# Patient Record
Sex: MALE | Race: WHITE | Employment: OTHER | URBAN - METROPOLITAN AREA
[De-identification: names, ages, dates, MRNs, and addresses within clinical notes are randomized per-mention and may not be internally consistent; named-entity substitution may affect disease eponyms.]

---

## 2017-01-24 ENCOUNTER — OFFICE VISIT (OUTPATIENT)
Dept: FAMILY MEDICINE | Facility: CLINIC | Age: 62
End: 2017-01-24
Payer: COMMERCIAL

## 2017-01-24 VITALS
RESPIRATION RATE: 14 BRPM | BODY MASS INDEX: 23.73 KG/M2 | TEMPERATURE: 97.7 F | SYSTOLIC BLOOD PRESSURE: 120 MMHG | WEIGHT: 169.5 LBS | HEIGHT: 71 IN | DIASTOLIC BLOOD PRESSURE: 72 MMHG | OXYGEN SATURATION: 100 % | HEART RATE: 55 BPM

## 2017-01-24 DIAGNOSIS — R68.84 JAW PAIN: Primary | ICD-10-CM

## 2017-01-24 DIAGNOSIS — M26.609 TMJ (TEMPOROMANDIBULAR JOINT SYNDROME): ICD-10-CM

## 2017-01-24 PROCEDURE — 99213 OFFICE O/P EST LOW 20 MIN: CPT | Performed by: PHYSICIAN ASSISTANT

## 2017-01-24 NOTE — PROGRESS NOTES
"  SUBJECTIVE:                                                    Vish Newsome is a 61 year old male who presents to clinic today for the following health issues:    Jaw Pain    Duration: X1 month    Description (location/character/radiation): right jaw    Intensity:  moderate    Accompanying signs and symptoms: Difficult to chew    History (similar episodes/previous evaluation): None    Precipitating or alleviating factors: None    Therapies tried and outcome: Ibuprophen     Started about a  Month ago where his right ear was hurting; he notes that it is more in the left side jaw first. Pain with chewing that is sharp and otherwise aching and throbbing pain that comes and goes. He notices it most at night. He has noted no clicking, popping or limitation in how much he can open or close the mouth or how tightly he can bite down.     No specific external trauma to the area.   He had septoplasty in December of 2016 and noted these symptoms shortly after that.   He is up to date on dental exams; most recent visit was about 3 months ago. Unsure when last panoramic XR was.   No hx of major dental work including root canals or surgical procedures in the right side of the jaw.     Was provided antibiotics for \"respiratory\" issue that he is taking amoxicillin for a few more days. He being treated for continued post nasal drip, mucus production, cough, sore throat- presumptive sinusitis per pt history.      No fever/chills, nausea, vomiting, body aches, joint swelling.     Problem list and histories reviewed & adjusted, as indicated.  Additional history: as documented    Patient Active Problem List   Diagnosis     Degeneration of lumbar intervertebral disc     Midline low back pain without sciatica     History reviewed. No pertinent past surgical history.    Social History   Substance Use Topics     Smoking status: Former Smoker     Smokeless tobacco: Not on file     Alcohol Use: No     Family History   Problem " "Relation Age of Onset     Family History Negative Mother      Family History Negative Father          No current outpatient prescriptions on file.       ROS:  Constitutional, HEENT, cardiovascular, pulmonary, gi and gu systems are negative, except as otherwise noted.    OBJECTIVE:                                                    /72 mmHg  Pulse 55  Temp(Src) 97.7  F (36.5  C) (Tympanic)  Resp 14  Ht 5' 10.75\" (1.797 m)  Wt 169 lb 8 oz (76.885 kg)  BMI 23.81 kg/m2  SpO2 100%  Body mass index is 23.81 kg/(m^2).  GENERAL: healthy, alert and no distress  HENT: ear canals and TM's normal, nose and mouth without ulcers or lesions. Dentition, buccal mucosa and gingiva was grossly normal  NECK: no adenopathy, no asymmetry, masses, or scars and thyroid normal to palpation  MS: temporomandibular joint is stable to manipulation, pt is able to open mouth fully, no audible clicking or popping noted. Patient is able to bite down normally while engaging the masseter muscles fully. No atrophy of musculature noted on exam.   SKIN: no swelling, bruising, rash on the face or neck.   NEURO: CN 2-12 grossly intact  PSYCH: mentation appears normal, affect normal/bright    Diagnostic Test Results:  none      ASSESSMENT/PLAN:                                                        ICD-10-CM    1. Jaw pain R68.84    2. TMJ (temporomandibular joint syndrome) M26.609 OTOLARYNGOLOGY REFERRAL     During our visit I was not convinced this was a true TMJ issue and recommended the patient have further evaluation by his dentist. He was provided a TMJ referral as above.     With further consideration I am curious if there could be some neuralgia of the glossopharyngeal nerve following his septoplasty last month (as that was about when the pain began) and the pain he describes is similar to that which is described for glossopharyngeal neuralgia.      Nicole Joy Siegler, PA-C  [facility]  "

## 2017-01-24 NOTE — NURSING NOTE
"Chief Complaint   Patient presents with     Jaw Pain     Right side of jaw X1 month, painful to chew       Initial /72 mmHg  Pulse 55  Temp(Src) 97.7  F (36.5  C) (Tympanic)  Resp 14  Ht 5' 10.75\" (1.797 m)  Wt 169 lb 8 oz (76.885 kg)  BMI 23.81 kg/m2  SpO2 100% Estimated body mass index is 23.81 kg/(m^2) as calculated from the following:    Height as of this encounter: 5' 10.75\" (1.797 m).    Weight as of this encounter: 169 lb 8 oz (76.885 kg).  BP completed using cuff size: regular  "

## 2017-01-24 NOTE — MR AVS SNAPSHOT
After Visit Summary   1/24/2017    Vish Newsome    MRN: [id number]           Patient Information     Date Of Birth          1955        Visit Information        Provider Department      1/24/2017 3:30 PM Siegler, Nicole Joy, PA-C [facility]        Today's Diagnoses     TMJ (temporomandibular joint syndrome)    -  1        Follow-ups after your visit        Additional Services     OTOLARYNGOLOGY REFERRAL       Your provider has referred you to:   [facility]: [place] [facility] (TMJ Only) [facility] [phone]   [email or web address]    Please be aware that coverage of these services is subject to the terms and limitations of your health insurance plan.  Call member services at your health plan with any benefit or coverage questions.      Please bring the following with you to your appointment:    (1) Any X-Rays, CTs or MRIs which have been performed.  Contact the facility where they were done to arrange for  prior to your scheduled appointment.   (2) List of current medications  (3) This referral request   (4) Any documents/labs given to you for this referral                  Who to contact     If you have questions or need follow up information about today's clinic visit or your schedule please contact [facility] directly at [phone].  Normal or non-critical lab and imaging results will be communicated to you by MyChart, letter or phone within 4 business days after the clinic has received the results. If you do not hear from us within 7 days, please contact the clinic through MyChart or phone. If you have a critical or abnormal lab result, we will notify you by phone as soon as possible.  Submit refill requests through [employer] or call your pharmacy and they will forward the refill request to us. Please allow 3 business days for your refill to be completed.          Additional Information About Your Visit    "     MyChart Information     [employer] lets you send messages to your doctor, view your test results, renew your prescriptions, schedule appointments and more. To sign up, go to [email or web address] . Click on \"Log in\" on the left side of the screen, which will take you to the Welcome page. Then click on \"Sign up Now\" on the right side of the page.     You will be asked to enter the access code listed below, as well as some personal information. Please follow the directions to create your username and password.     Your access code is: [email or web address]  Expires: 2017  4:00 PM     Your access code will  in 90 days. If you need help or a new code, please call your [facility] clinic or [phone].        Care EveryWhere ID     This is your Care EveryWhere ID. This could be used by other organizations to access your [facility] medical records  [id number]        Your Vitals Were     Pulse Temperature Respirations Height BMI (Body Mass Index) Pulse Oximetry    55 97.7  F (36.5  C) (Tympanic) 14 5' 10.75\" (1.797 m) 23.81 kg/m2 100%       Blood Pressure from Last 3 Encounters:   17 120/72   16 87/55   16 112/60    Weight from Last 3 Encounters:   17 169 lb 8 oz (76.885 kg)   16 165 lb 6.4 oz (75.025 kg)   16 163 lb (73.936 kg)              We Performed the Following     OTOLARYNGOLOGY REFERRAL        Primary Care Provider Office Phone # Fax #    Vincenzo Maki [phone]       [facility] [address]  [place] [address]        Thank you!     Thank you for choosing [facility]  for your care. Our goal is always to provide you with excellent care. Hearing back from our patients is one way we can continue to improve our services. Please take a few minutes to complete the written survey that you may receive in the mail after your visit with us. Thank you!             Your Updated Medication List - Protect others " around you: Learn how to safely use, store and throw away your medicines at www.disposemymeds.org.      Notice  As of 1/24/2017  4:00 PM    You have not been prescribed any medications.

## 2017-01-25 ENCOUNTER — TELEPHONE (OUTPATIENT)
Dept: FAMILY MEDICINE | Facility: CLINIC | Age: 62
End: 2017-01-25

## 2017-01-25 NOTE — TELEPHONE ENCOUNTER
I would like to know the name of his ENT (surgeon) who did his septoplasty so we can discuss with him the possibility of his jaw symptoms being caused possibly by glossopharyngeal neuralgia or some other secondary side effect be it temprorary or permanent from his recent septoplasty.

## 2017-01-25 NOTE — TELEPHONE ENCOUNTER
I LVM for Natan to call me back. If he returns the call please get the name of his ENT who did his septoplasty . I would like to speak with them regarding his current concern. Thanks! Nicole Joy Siegler, PA-C

## 2017-01-27 ENCOUNTER — TELEPHONE (OUTPATIENT)
Dept: FAMILY MEDICINE | Facility: CLINIC | Age: 62
End: 2017-01-27

## 2017-01-27 NOTE — TELEPHONE ENCOUNTER
Reason for Call: Request for an order or referral:    Order or referral being requested: Referral for TMJ    Date needed: as soon as possible    Has the patient been seen by the PCP for this problem? YES    Additional comments: Patient saw Kimberlyn earlier this week and was referred to [facility]. Patient is not going there. He has an appt on Monday 1/30/17 at [facility] [address] [place] [address] [address] [facility] [address]. Fax [phone]. Please send a new refferal to thisplace before Monday. If any questions please call    Phone number Patient can be reached at:  Home number on file [phone] (home)    Best Time:  Any    Can we leave a detailed message on this number?  YES    Call taken on 1/27/2017 at 11:34 AM by DEBBIE GUILLEN;

## 2017-01-27 NOTE — TELEPHONE ENCOUNTER
Patient states that [facility] is not contusive to his schedule and is going to go to [facility].Requesting referral be sent to them.Explained provider not in clinic today so will fax referral previously generated and fax to clinic of choice.Pt is okay with that.

## 2017-02-03 NOTE — TELEPHONE ENCOUNTER
I LVM for Dr Camarillo's care team to contact me to discuss this concern. Nicole Joy Siegler, PA-C

## 2017-02-08 NOTE — TELEPHONE ENCOUNTER
Dr Camarillo's team confirmed they are not concerned/ convinced the swelling from the surgery would have anything to do with his symptoms. Symptoms seem to have gotten better since our most recent visit. He will let us know if he has any further symptoms. Nicole Joy Siegler, PA-C

## 2017-04-28 ENCOUNTER — TRANSFERRED RECORDS (OUTPATIENT)
Dept: HEALTH INFORMATION MANAGEMENT | Facility: CLINIC | Age: 62
End: 2017-04-28

## 2017-04-28 ENCOUNTER — OFFICE VISIT (OUTPATIENT)
Dept: URGENT CARE | Facility: URGENT CARE | Age: 62
End: 2017-04-28
Payer: COMMERCIAL

## 2017-04-28 VITALS
OXYGEN SATURATION: 95 % | TEMPERATURE: 97.7 F | HEART RATE: 64 BPM | SYSTOLIC BLOOD PRESSURE: 110 MMHG | BODY MASS INDEX: 22.89 KG/M2 | WEIGHT: 163 LBS | DIASTOLIC BLOOD PRESSURE: 70 MMHG

## 2017-04-28 DIAGNOSIS — S05.92XA BILATERAL EYE INJURIES, INITIAL ENCOUNTER: Primary | ICD-10-CM

## 2017-04-28 DIAGNOSIS — S05.91XA BILATERAL EYE INJURIES, INITIAL ENCOUNTER: Primary | ICD-10-CM

## 2017-04-28 PROCEDURE — 99213 OFFICE O/P EST LOW 20 MIN: CPT | Performed by: FAMILY MEDICINE

## 2017-04-28 RX ORDER — TOBRAMYCIN 3 MG/ML
2 SOLUTION/ DROPS OPHTHALMIC 4 TIMES DAILY
Qty: 3 ML | Refills: 0 | Status: SHIPPED | OUTPATIENT
Start: 2017-04-28 | End: 2017-05-05

## 2017-04-28 NOTE — MR AVS SNAPSHOT
After Visit Summary   4/28/2017    Vish Newsome    MRN: [id number]           Patient Information     Date Of Birth          1955        Visit Information        Provider Department      4/28/2017 5:15 PM Moises Maki DO [facility]        Today's Diagnoses     Bilateral eye injuries, initial encounter    -  1       Follow-ups after your visit        Additional Services     OPHTHALMOLOGY ADULT REFERRAL       Your provider has referred you to: [facility]: Eye Clinic [facility] [phone]   [email or web address]  FHN: [facility]. [facility] [place] [phone] [email or web address]  FHN: [facility] [phone]    Please be aware that coverage of these services is subject to the terms and limitations of your health insurance plan.  Call member services at your health plan with any benefit or coverage questions.      Please bring the following with you to your appointment:    (1) Any X-Rays, CTs or MRIs which have been performed.  Contact the facility where they were done to arrange for  prior to your scheduled appointment.    (2) List of current medications  (3) This referral request   (4) Any documents/labs given to you for this referral                  Who to contact     If you have questions or need follow up information about today's clinic visit or your schedule please contact [facility] directly at [phone].  Normal or non-critical lab and imaging results will be communicated to you by MyChart, letter or phone within 4 business days after the clinic has received the results. If you do not hear from us within 7 days, please contact the clinic through MyChart or phone. If you have a critical or abnormal lab result, we will notify you by phone as soon as possible.  Submit refill requests through [employer] or call your pharmacy and they will forward  "the refill request to us. Please allow 3 business days for your refill to be completed.          Additional Information About Your Visit        [employer] Information     [employer] lets you send messages to your doctor, view your test results, renew your prescriptions, schedule appointments and more. To sign up, go to [email or web address] . Click on \"Log in\" on the left side of the screen, which will take you to the Welcome page. Then click on \"Sign up Now\" on the right side of the page.     You will be asked to enter the access code listed below, as well as some personal information. Please follow the directions to create your username and password.     Your access code is: [email or web address]  Expires: 2017  5:38 PM     Your access code will  in 90 days. If you need help or a new code, please call your [facility] clinic or [phone].        Care EveryWhere ID     This is your Care EveryWhere ID. This could be used by other organizations to access your [facility] medical records  [id number]        Your Vitals Were     Pulse Temperature Pulse Oximetry BMI (Body Mass Index)          64 97.7  F (36.5  C) (Oral) 95% 22.89 kg/m2         Blood Pressure from Last 3 Encounters:   17 110/70   17 120/72   16 (!) 87/55    Weight from Last 3 Encounters:   17 163 lb (73.9 kg)   17 169 lb 8 oz (76.9 kg)   16 165 lb 6.4 oz (75 kg)              We Performed the Following     OPHTHALMOLOGY ADULT REFERRAL          Today's Medication Changes          These changes are accurate as of: 17  5:38 PM.  If you have any questions, ask your nurse or doctor.               Start taking these medicines.        Dose/Directions    tobramycin 0.3 % ophthalmic solution   Commonly known as:  TOBREX   Used for:  Bilateral eye injuries, initial encounter        Dose:  2 drop   Place 2 drops Into the left eye 4 times daily for 7 days   Quantity:  3 mL   Refills:  0            Where to get your medicines    "   These medications were sent to [facility], [place] - [address]  [address], [facility] [address]     Phone:  [phone]     tobramycin 0.3 % ophthalmic solution                Primary Care Provider Office Phone # Fax #    Vincenzo Maki [phone]       [facility] [address]  [facility] [address]        Thank you!     Thank you for choosing [facility]  for your care. Our goal is always to provide you with excellent care. Hearing back from our patients is one way we can continue to improve our services. Please take a few minutes to complete the written survey that you may receive in the mail after your visit with us. Thank you!             Your Updated Medication List - Protect others around you: Learn how to safely use, store and throw away your medicines at www.disposemymeds.org.          This list is accurate as of: 4/28/17  5:38 PM.  Always use your most recent med list.                   Brand Name Dispense Instructions for use    tobramycin 0.3 % ophthalmic solution    TOBREX    3 mL    Place 2 drops Into the left eye 4 times daily for 7 days

## 2017-04-28 NOTE — NURSING NOTE
"Chief Complaint   Patient presents with     Eye Problem     foregin object in right eye, x 2 hour ago, possible dry wall dust       Initial /70 (BP Location: Left arm, Patient Position: Chair, Cuff Size: Adult Regular)  Pulse 64  Temp 97.7  F (36.5  C) (Oral)  Wt 163 lb (73.9 kg)  SpO2 95%  BMI 22.89 kg/m2 Estimated body mass index is 22.89 kg/(m^2) as calculated from the following:    Height as of 1/24/17: 5' 10.75\" (1.797 m).    Weight as of this encounter: 163 lb (73.9 kg).  Medication Reconciliation: complete  "

## 2017-05-10 NOTE — PROGRESS NOTES
SUBJECTIVE:Chief Complaint:   Chief Complaint   Patient presents with     Eye Problem     foregin object in right eye, x 2 hour ago, possible dry wall dust      History of Present Illness: Vish Newsome is a 61 year old male who presents complaining of both eyes injury dry wall dust for Today.  Onset/timing: sudden.   Associated Signs and Symptoms: none   Treatment measures tried include: flushed  Contact wearer : No    No past medical history on file.  No Known Allergies  Social History   Substance Use Topics     Smoking status: Former Smoker     Smokeless tobacco: Not on file     Alcohol use No       ROS:  negative for photophobia, pain, vision change    OBJECTIVE:  /70 (BP Location: Left arm, Patient Position: Chair, Cuff Size: Adult Regular)  Pulse 64  Temp 97.7  F (36.5  C) (Oral)  Wt 163 lb (73.9 kg)  SpO2 95%  BMI 22.89 kg/m2   General: no acute distress  Eye exam: right eye normal lid, conjunctiva, cornea, pupil and fundus, left eye normal lid, conjunctiva, cornea, pupil and fundus.  JOSEPH, EOMI, fundi normal, corneas normal, no foreign bodies, flourescein staining is negative, visual acuity normal both eyes, no periorbital cellulitis      ICD-10-CM    1. Bilateral eye injuries, initial encounter S05.91XA tobramycin (TOBREX) 0.3 % ophthalmic solution    S05.92XA OPHTHALMOLOGY ADULT REFERRAL       Return to clinic if no improvement or worsening condition.

## 2017-09-28 ENCOUNTER — ALLIED HEALTH/NURSE VISIT (OUTPATIENT)
Dept: NURSING | Facility: CLINIC | Age: 62
End: 2017-09-28
Payer: COMMERCIAL

## 2017-09-28 DIAGNOSIS — Z23 NEED FOR PROPHYLACTIC VACCINATION AND INOCULATION AGAINST INFLUENZA: Primary | ICD-10-CM

## 2017-09-28 PROCEDURE — 90471 IMMUNIZATION ADMIN: CPT

## 2017-09-28 PROCEDURE — 90686 IIV4 VACC NO PRSV 0.5 ML IM: CPT

## 2017-09-28 PROCEDURE — 99207 ZZC NO CHARGE NURSE ONLY: CPT

## 2017-09-28 NOTE — PROGRESS NOTES
Injectable Influenza Immunization Documentation    1.  Is the person to be vaccinated sick today?   No    2. Does the person to be vaccinated have an allergy to a component   of the vaccine?   No    3. Has the person to be vaccinated ever had a serious reaction   to influenza vaccine in the past?   No    4. Has the person to be vaccinated ever had Guillain-Barré syndrome?   No    Form completed by Yesi Olmstead CMA

## 2017-09-28 NOTE — MR AVS SNAPSHOT
"              After Visit Summary   9/28/2017    Vish Newsome    MRN: [id number]           Patient Information     Date Of Birth          1955        Visit Information        Provider Department      9/28/2017 3:30 PM BX NURSE [facility]        Today's Diagnoses     Need for prophylactic vaccination and inoculation against influenza    -  1       Follow-ups after your visit        Your next 10 appointments already scheduled     Oct 04, 2017  4:15 PM CDT   PHYSICAL with Moises [facility]tero [facility]   [facility]    [address] [place] [address]   [phone]              Who to contact     If you have questions or need follow up information about today's clinic visit or your schedule please contact [facility] directly at [phone].  Normal or non-critical lab and imaging results will be communicated to you by MyChart, letter or phone within 4 business days after the clinic has received the results. If you do not hear from us within 7 days, please contact the clinic through [employer] or phone. If you have a critical or abnormal lab result, we will notify you by phone as soon as possible.  Submit refill requests through [employer] or call your pharmacy and they will forward the refill request to us. Please allow 3 business days for your refill to be completed.          Additional Information About Your Visit        MyChart Information     [employer] lets you send messages to your doctor, view your test results, renew your prescriptions, schedule appointments and more. To sign up, go to [email or web address] . Click on \"Log in\" on the left side of the screen, which will take you to the Welcome page. Then click on \"Sign up Now\" on the right side of the page.     You will be asked to enter the access code listed below, as well as some personal information. " Please follow the directions to create your username and password.     Your access code is: [email or web address]  Expires: 2017  3:45 PM     Your access code will  in 90 days. If you need help or a new code, please call your [facility] clinic or [phone].        Care EveryWhere ID     This is your Care EveryWhere ID. This could be used by other organizations to access your [facility] medical records  [id number]         Blood Pressure from Last 3 Encounters:   17 110/70   17 120/72   16 (!) 87/55    Weight from Last 3 Encounters:   17 163 lb (73.9 kg)   17 169 lb 8 oz (76.9 kg)   16 165 lb 6.4 oz (75 kg)              We Performed the Following     FLU VAC, SPLIT VIRUS IM > 3 YO (QUADRIVALENT) [98470]     Vaccine Administration, Initial [269[address]]        Primary Care Provider Office Phone # Fax #    Vincenzo Maki [phone] [address] [address]        Equal Access to Services     [facility]: Hadii aad ku hadasho Soomaali, waaxda luqadaha, qaybta kaalmada adeegyada, magalys peguero . So [facility] [phone].    ATENCIÓN: Si habla español, tiene a fabian disposición servicios gratuitos de asistencia lingüística. Llame al [phone].    We comply with applicable federal civil rights laws and [place] laws. We do not discriminate on the basis of race, color, national origin, age, disability sex, sexual orientation or gender identity.            Thank you!     Thank you for choosing [facility]  for your care. Our goal is always to provide you with excellent care. Hearing back from our patients is one way we can continue to improve our services. Please take a few minutes to complete the written survey that you may receive in the mail after your visit with us. Thank you!             Your Updated Medication List - Protect others around you: Learn how to safely use, store and throw away  your medicines at www.disposemymeds.org.      Notice  As of 9/28/2017  3:45 PM    You have not been prescribed any medications.

## 2017-10-04 ENCOUNTER — OFFICE VISIT (OUTPATIENT)
Dept: FAMILY MEDICINE | Facility: CLINIC | Age: 62
End: 2017-10-04
Payer: COMMERCIAL

## 2017-10-04 VITALS
WEIGHT: 163 LBS | TEMPERATURE: 98.5 F | BODY MASS INDEX: 22.82 KG/M2 | OXYGEN SATURATION: 100 % | DIASTOLIC BLOOD PRESSURE: 64 MMHG | RESPIRATION RATE: 20 BRPM | HEIGHT: 71 IN | SYSTOLIC BLOOD PRESSURE: 118 MMHG | HEART RATE: 57 BPM

## 2017-10-04 DIAGNOSIS — E78.5 HYPERLIPIDEMIA LDL GOAL <100: ICD-10-CM

## 2017-10-04 DIAGNOSIS — Z00.00 ROUTINE GENERAL MEDICAL EXAMINATION AT A HEALTH CARE FACILITY: Primary | ICD-10-CM

## 2017-10-04 DIAGNOSIS — R53.82 CHRONIC FATIGUE: ICD-10-CM

## 2017-10-04 DIAGNOSIS — E55.9 VITAMIN D DEFICIENCY: ICD-10-CM

## 2017-10-04 PROBLEM — Z29.9 PREVENTIVE MEASURE: Status: ACTIVE | Noted: 2017-10-04

## 2017-10-04 PROCEDURE — 99396 PREV VISIT EST AGE 40-64: CPT | Performed by: INTERNAL MEDICINE

## 2017-10-04 NOTE — PATIENT INSTRUCTIONS
"I have generated a lab order.              Please call ahead for a \"lab only\" appointment.           I will let you know your lab results.       "

## 2017-10-04 NOTE — NURSING NOTE
"Chief Complaint   Patient presents with     Physical       Initial /64 (BP Location: Left arm, Patient Position: Chair, Cuff Size: Adult Large)  Pulse 57  Temp 98.5  F (36.9  C)  Resp 20  Ht 5' 10.75\" (1.797 m)  Wt 163 lb (73.9 kg)  SpO2 100%  BMI 22.89 kg/m2 Estimated body mass index is 22.89 kg/(m^2) as calculated from the following:    Height as of this encounter: 5' 10.75\" (1.797 m).    Weight as of this encounter: 163 lb (73.9 kg).  Medication Reconciliation: complete   Yasmin Laguerre LPN  "

## 2017-10-04 NOTE — PROGRESS NOTES
"SUBJECTIVE:   CC: Vish Newsome is an 61 year old male who presents for preventative health visit.     Physical   Annual:     Getting at least 3 servings of Calcium per day::  Yes    Bi-annual eye exam::  Yes    Dental care twice a year::  Yes    Sleep apnea or symptoms of sleep apnea::  None    Diet::  Regular (no restrictions)    Frequency of exercise::  2-3 days/week    Duration of exercise::  45-60 minutes    Taking medications regularly::  No    Barriers to taking medications::  Not applicable    Additional concerns today::  No            New pt to me.                   Does not want prostate cancer screening.     He states that prostate cancer screening is \"a hoax\".                    Wants labs to include an A1C.        Takes vit B12.       Wants to check vit D.                        He wrote down the names of some other lab tests that he wanted done, but he left the list at home.             He believes that overall he is healthy. He exercises. Does have some chronic fatigue symptoms. Has some poor sleep issues for which he went to the sleep clinic last year.                             Today's PHQ-2 Score: PHQ-2 ( 1999 [employer]) 10/4/2017   Q1: Little interest or pleasure in doing things 0   Q2: Feeling down, depressed or hopeless 0   PHQ-2 Score 0   Q1: Little interest or pleasure in doing things Not at all   Q2: Feeling down, depressed or hopeless Not at all   PHQ-2 Score 0       Abuse: Current or Past(Physical, Sexual or Emotional)- No  Do you feel safe in your environment - Yes      The patient does not drink >3 drinks per day nor >7 drinks per week.    Last PSA: No results found for: PSA    Reviewed orders with patient. Reviewed health maintenance and updated orders accordingly - Yes  Patient Active Problem List    Diagnosis Date Noted     Hyperlipidemia LDL goal <100 10/04/2017     Priority: Medium     Chronic fatigue 10/04/2017     Priority: Medium     Degeneration of lumbar intervertebral disc " 04/05/2016     Priority: Medium     Midline low back pain without sciatica 04/05/2016     Priority: Medium     Preventive measure 10/04/2017     Priority: Low     colonoscopy 10/16;          Past Surgical History:   Procedure Laterality Date     ENT SURGERY  10/2016    nasal septal deviation     Social History     Social History     Marital status:      Spouse name: N/A     Number of children: 0     Years of education: N/A     Occupational History     Not on file.     Social History Main Topics     Smoking status: Never Smoker     Smokeless tobacco: Not on file     Alcohol use No     Drug use: No     Sexual activity: Yes     Partners: Female     Other Topics Concern     Not on file     Social History Narrative         BP Readings from Last 3 Encounters:   10/04/17 118/64   04/28/17 110/70   01/24/17 120/72    Wt Readings from Last 3 Encounters:   10/04/17 163 lb (73.9 kg)   04/28/17 163 lb (73.9 kg)   01/24/17 169 lb 8 oz (76.9 kg)                  No current outpatient prescriptions on file.     No Known Allergies      Reviewed and updated as needed this visit by clinical staffAllergies         Reviewed and updated as needed this visit by Provider              ROS:  C: NEGATIVE for fever, chills, change in weight  I: NEGATIVE for worrisome rashes, moles or lesions  E: NEGATIVE for vision changes or irritation  ENT: NEGATIVE for ear, mouth and throat problems  R: NEGATIVE for significant cough or SOB  CV: NEGATIVE for chest pain, palpitations or peripheral edema  GI: NEGATIVE for nausea, abdominal pain, heartburn, or change in bowel habits   male: negative for dysuria, hematuria, decreased urinary stream, erectile dysfunction, urethral discharge  M: NEGATIVE for significant arthralgias or myalgia  N: NEGATIVE for weakness, dizziness or paresthesias  P: NEGATIVE for changes in mood or affect    OBJECTIVE:   /64 (BP Location: Left arm, Patient Position: Chair, Cuff Size: Adult Large)  Pulse 57  Temp  "98.5  F (36.9  C)  Resp 20  Ht 5' 10.75\" (1.797 m)  Wt 163 lb (73.9 kg)  SpO2 100%  BMI 22.89 kg/m2    EXAM:  GENERAL: healthy, alert and no distress  EYES: Eyes grossly normal to inspection, PERRL and conjunctivae and sclerae normal  HENT: ear canals and TM's normal, nose and mouth without ulcers or lesions  NECK: no adenopathy, no asymmetry, masses, or scars and thyroid normal to palpation  RESP: lungs clear to auscultation - no rales, rhonchi or wheezes  CV: regular rate and rhythm, normal S1 S2, no S3 or S4, no murmur, click or rub, no peripheral edema and peripheral pulses strong  ABDOMEN: soft, nontender, no hepatosplenomegaly, no masses and bowel sounds normal   (male): normal male genitalia without lesions or urethral discharge, no hernia  RECTAL: refused  MS: no gross musculoskeletal defects noted, no edema  SKIN: no suspicious lesions or rashes  NEURO: Normal strength and tone, mentation intact and speech normal  PSYCH: mentation appears normal, affect normal/bright    ASSESSMENT/PLAN:   Vish was seen today for physical.    Diagnoses and all orders for this visit:    Routine general medical examination at a health care facility    Hyperlipidemia LDL goal <100  -     Comprehensive metabolic panel (BMP + Alb, Alk Phos, ALT, AST, Total. Bili, TP); Future  -     Lipid Profile (Chol, Trig, HDL, LDL calc); Future  -     Hemoglobin A1c; Future    Chronic fatigue  -     Lipid Profile (Chol, Trig, HDL, LDL calc); Future  -     TSH with free T4 reflex; Future  -     CBC with platelets and differential; Future    Vitamin D deficiency  -     Vitamin D Deficiency; Future                       Summary and implications:  Overall he appears to be in good health.         Patient Instructions   I have generated a lab order.              Please call ahead for a \"lab only\" appointment.           I will let you know your lab results.             COUNSELING:   Reviewed preventive health counseling, as reflected in " "patient instructions  Special attention given to:        Regular exercise       Healthy diet/nutrition         reports that he has quit smoking. He does not have any smokeless tobacco history on file.      Estimated body mass index is 22.89 kg/(m^2) as calculated from the following:    Height as of 1/24/17: 5' 10.75\" (1.797 m).    Weight as of 4/28/17: 163 lb (73.9 kg).         Counseling Resources:  ATP IV Guidelines  Pooled Cohorts Equation Calculator  FRAX Risk Assessment  ICSI Preventive Guidelines  Dietary Guidelines for Americans, 2010  [employer]'s MyPlate  ASA Prophylaxis  Lung CA Screening    Moises Otero MD  [facility] XERXESAnswers for HPI/ROS submitted by the patient on 10/4/2017   PHQ-2 Score: 0           Recent Results (from the past 90 hour(s))   Comprehensive metabolic panel (BMP + Alb, Alk Phos, ALT, AST, Total. Bili, TP)    Collection Time: 12/12/17  8:13 AM   Result Value Ref Range    Sodium 140 133 - 144 mmol/L    Potassium 4.1 3.4 - 5.3 mmol/L    Chloride 106 94 - 109 mmol/L    Carbon Dioxide 29 20 - 32 mmol/L    Anion Gap 5 3 - 14 mmol/L    Glucose 90 70 - 99 mg/dL    Urea Nitrogen 16 7 - 30 mg/dL    Creatinine 1.10 0.66 - 1.25 mg/dL    GFR Estimate 68 >60 mL/min/1.7m2    GFR Estimate If Black 82 >60 mL/min/1.7m2    Calcium 9.0 8.5 - 10.1 mg/dL    Bilirubin Total 1.2 0.2 - 1.3 mg/dL    Albumin 3.8 3.4 - 5.0 g/dL    Protein Total 7.3 6.8 - 8.8 g/dL    Alkaline Phosphatase 97 40 - 150 U/L    ALT 34 0 - 70 U/L    AST 31 0 - 45 U/L   Lipid panel reflex to direct LDL    Collection Time: 12/12/17  8:13 AM   Result Value Ref Range    Cholesterol 210 (H) <200 mg/dL    Triglycerides 115 <150 mg/dL    HDL Cholesterol 51 >39 mg/dL    LDL Cholesterol Calculated 136 (H) <100 mg/dL    Non HDL Cholesterol 159 (H) <130 mg/dL   TSH with free T4 reflex    Collection Time: 12/12/17  8:13 AM   Result Value Ref Range    TSH 3.36 0.40 - 4.00 mU/L   CBC with platelets and differential    " "Collection Time: 12/12/17  8:13 AM   Result Value Ref Range    WBC 5.6 4.0 - 11.0 10e9/L    RBC Count 5.04 4.4 - 5.9 10e12/L    Hemoglobin 15.4 13.3 - 17.7 g/dL    Hematocrit 45.5 40.0 - 53.0 %    MCV 90 78 - 100 fl    MCH 30.6 26.5 - 33.0 pg    MCHC 33.8 31.5 - 36.5 g/dL    RDW 14.0 10.0 - 15.0 %    Platelet Count 274 150 - 450 10e9/L    Diff Method Automated Method     % Neutrophils 52.1 %    % Lymphocytes 37.3 %    % Monocytes 7.2 %    % Eosinophils 2.9 %    % Basophils 0.5 %    Absolute Neutrophil 2.9 1.6 - 8.3 10e9/L    Absolute Lymphocytes 2.1 0.8 - 5.3 10e9/L    Absolute Monocytes 0.4 0.0 - 1.3 10e9/L    Absolute Eosinophils 0.2 0.0 - 0.7 10e9/L    Absolute Basophils 0.0 0.0 - 0.2 10e9/L   Vitamin D Deficiency    Collection Time: 12/12/17  8:13 AM   Result Value Ref Range    Vitamin D Deficiency screening 65 20 - 75 ug/L   Hemoglobin A1c    Collection Time: 12/12/17  8:13 AM   Result Value Ref Range    Hemoglobin A1C 5.2 4.3 - 6.0 %     Letter sent.                  Most of your lab results are very good,including the kidney,liver,bone marrow,thyroid,glucose,and the vitamin D level.                The A1C of 5.2 correlates to an average blood sugar of approximately 96.             Your LDL or \" bad\" cholesterol is slightly high at 136.                              "

## 2017-10-04 NOTE — MR AVS SNAPSHOT
"              After Visit Summary   10/4/2017    Vish Newsome    MRN: [id number]           Patient Information     Date Of Birth          1955        Visit Information        Provider Department      10/4/2017 4:15 PM Moises Otero MD [facility]        Today's Diagnoses     Routine general medical examination at a health care facility    -  1    Hyperlipidemia LDL goal <100        Chronic fatigue        Vitamin D deficiency          Care Instructions    I have generated a lab order.              Please call ahead for a \"lab only\" appointment.           I will let you know your lab results.               Follow-ups after your visit        Future tests that were ordered for you today     Open Future Orders        Priority Expected Expires Ordered    Comprehensive metabolic panel (BMP + Alb, Alk Phos, ALT, AST, Total. Bili, TP) Routine 10/5/2017 12/4/2017 10/4/2017    Lipid Profile (Chol, Trig, HDL, LDL calc) Routine 10/5/2017 12/4/2017 10/4/2017    TSH with free T4 reflex Routine 10/5/2017 12/4/2017 10/4/2017    CBC with platelets and differential Routine 10/5/2017 12/4/2017 10/4/2017    Vitamin D Deficiency Routine 10/5/2017 12/4/2017 10/4/2017    Hemoglobin A1c Routine 10/5/2017 12/4/2017 10/4/2017            Who to contact     If you have questions or need follow up information about today's clinic visit or your schedule please contact [facility] directly at [phone].  Normal or non-critical lab and imaging results will be communicated to you by MyChart, letter or phone within 4 business days after the clinic has received the results. If you do not hear from us within 7 days, please contact the clinic through MyChart or phone. If you have a critical or abnormal lab result, we will notify you by phone as soon as possible.  Submit refill requests through [employer] or call your pharmacy and they will forward the refill request to us. Please " "allow 3 business days for your refill to be completed.          Additional Information About Your Visit        Care EveryWhere ID     This is your Care EveryWhere ID. This could be used by other organizations to access your [facility] medical records  [id number]        Your Vitals Were     Pulse Temperature Respirations Height Pulse Oximetry BMI (Body Mass Index)    57 98.5  F (36.9  C) 20 5' 10.75\" (1.797 m) 100% 22.89 kg/m2       Blood Pressure from Last 3 Encounters:   10/04/17 118/64   04/28/17 110/70   01/24/17 120/72    Weight from Last 3 Encounters:   10/04/17 163 lb (73.9 kg)   04/28/17 163 lb (73.9 kg)   01/24/17 169 lb 8 oz (76.9 kg)               Primary Care Provider Office Phone # Fax #    Vincenzo Maki [phone] [address] [facility] [address]  [place] [address]        Equal Access to Services     [facility]: Hadii aad ku hadasho Soomaali, waaxda luqadaha, qaybta kaalmada adeegyada, waxay idiin hayaan russeleg kharaharoon la'ceciln . So [facility] [phone].    ATENCIÓN: Si habla español, tiene a fabian disposición servicios gratuitos de asistencia lingüística. Pashaame al [phone].    We comply with applicable federal civil rights laws and [place] laws. We do not discriminate on the basis of race, color, national origin, age, disability, sex, sexual orientation, or gender identity.            Thank you!     Thank you for choosing [facility]  for your care. Our goal is always to provide you with excellent care. Hearing back from our patients is one way we can continue to improve our services. Please take a few minutes to complete the written survey that you may receive in the mail after your visit with us. Thank you!             Your Updated Medication List - Protect others around you: Learn how to safely use, store and throw away your medicines at www.disposemymeds.org.      Notice  As of 10/4/2017  4:58 PM    You have not been prescribed any medications.      "

## 2017-10-06 DIAGNOSIS — E55.9 VITAMIN D DEFICIENCY: ICD-10-CM

## 2017-10-06 DIAGNOSIS — E78.5 HYPERLIPIDEMIA LDL GOAL <100: ICD-10-CM

## 2017-10-06 DIAGNOSIS — R53.82 CHRONIC FATIGUE: ICD-10-CM

## 2017-10-27 ENCOUNTER — TELEPHONE (OUTPATIENT)
Dept: FAMILY MEDICINE | Facility: CLINIC | Age: 62
End: 2017-10-27

## 2017-10-27 NOTE — TELEPHONE ENCOUNTER
Reason for Call:  Other Establish care    Detailed comments: Dr. Tineo wrote note for FD today that it is okay  For pt to establish care with Dr. Tineo    Best Time: Anytime

## 2017-12-12 DIAGNOSIS — E78.5 HYPERLIPIDEMIA LDL GOAL <100: ICD-10-CM

## 2017-12-12 DIAGNOSIS — R53.82 CHRONIC FATIGUE: ICD-10-CM

## 2017-12-12 DIAGNOSIS — E55.9 VITAMIN D DEFICIENCY: ICD-10-CM

## 2017-12-12 LAB
ALBUMIN SERPL-MCNC: 3.8 G/DL (ref 3.4–5)
ALP SERPL-CCNC: 97 U/L (ref 40–150)
ALT SERPL W P-5'-P-CCNC: 34 U/L (ref 0–70)
ANION GAP SERPL CALCULATED.3IONS-SCNC: 5 MMOL/L (ref 3–14)
AST SERPL W P-5'-P-CCNC: 31 U/L (ref 0–45)
BASOPHILS # BLD AUTO: 0 10E9/L (ref 0–0.2)
BASOPHILS NFR BLD AUTO: 0.5 %
BILIRUB SERPL-MCNC: 1.2 MG/DL (ref 0.2–1.3)
BUN SERPL-MCNC: 16 MG/DL (ref 7–30)
CALCIUM SERPL-MCNC: 9 MG/DL (ref 8.5–10.1)
CHLORIDE SERPL-SCNC: 106 MMOL/L (ref 94–109)
CHOLEST SERPL-MCNC: 210 MG/DL
CO2 SERPL-SCNC: 29 MMOL/L (ref 20–32)
CREAT SERPL-MCNC: 1.1 MG/DL (ref 0.66–1.25)
DIFFERENTIAL METHOD BLD: NORMAL
EOSINOPHIL # BLD AUTO: 0.2 10E9/L (ref 0–0.7)
EOSINOPHIL NFR BLD AUTO: 2.9 %
ERYTHROCYTE [DISTWIDTH] IN BLOOD BY AUTOMATED COUNT: 14 % (ref 10–15)
GFR SERPL CREATININE-BSD FRML MDRD: 68 ML/MIN/1.7M2
GLUCOSE SERPL-MCNC: 90 MG/DL (ref 70–99)
HBA1C MFR BLD: 5.2 % (ref 4.3–6)
HCT VFR BLD AUTO: 45.5 % (ref 40–53)
HDLC SERPL-MCNC: 51 MG/DL
HGB BLD-MCNC: 15.4 G/DL (ref 13.3–17.7)
LDLC SERPL CALC-MCNC: 136 MG/DL
LYMPHOCYTES # BLD AUTO: 2.1 10E9/L (ref 0.8–5.3)
LYMPHOCYTES NFR BLD AUTO: 37.3 %
MCH RBC QN AUTO: 30.6 PG (ref 26.5–33)
MCHC RBC AUTO-ENTMCNC: 33.8 G/DL (ref 31.5–36.5)
MCV RBC AUTO: 90 FL (ref 78–100)
MONOCYTES # BLD AUTO: 0.4 10E9/L (ref 0–1.3)
MONOCYTES NFR BLD AUTO: 7.2 %
NEUTROPHILS # BLD AUTO: 2.9 10E9/L (ref 1.6–8.3)
NEUTROPHILS NFR BLD AUTO: 52.1 %
NONHDLC SERPL-MCNC: 159 MG/DL
PLATELET # BLD AUTO: 274 10E9/L (ref 150–450)
POTASSIUM SERPL-SCNC: 4.1 MMOL/L (ref 3.4–5.3)
PROT SERPL-MCNC: 7.3 G/DL (ref 6.8–8.8)
RBC # BLD AUTO: 5.04 10E12/L (ref 4.4–5.9)
SODIUM SERPL-SCNC: 140 MMOL/L (ref 133–144)
TRIGL SERPL-MCNC: 115 MG/DL
TSH SERPL DL<=0.005 MIU/L-ACNC: 3.36 MU/L (ref 0.4–4)
WBC # BLD AUTO: 5.6 10E9/L (ref 4–11)

## 2017-12-12 PROCEDURE — 80050 GENERAL HEALTH PANEL: CPT | Performed by: INTERNAL MEDICINE

## 2017-12-12 PROCEDURE — 82306 VITAMIN D 25 HYDROXY: CPT | Performed by: INTERNAL MEDICINE

## 2017-12-12 PROCEDURE — 80061 LIPID PANEL: CPT | Performed by: INTERNAL MEDICINE

## 2017-12-12 PROCEDURE — 36415 COLL VENOUS BLD VENIPUNCTURE: CPT | Performed by: INTERNAL MEDICINE

## 2017-12-12 PROCEDURE — 83036 HEMOGLOBIN GLYCOSYLATED A1C: CPT | Performed by: INTERNAL MEDICINE

## 2017-12-13 LAB — DEPRECATED CALCIDIOL+CALCIFEROL SERPL-MC: 65 UG/L (ref 20–75)

## 2018-09-17 ENCOUNTER — TELEPHONE (OUTPATIENT)
Dept: FAMILY MEDICINE | Facility: CLINIC | Age: 63
End: 2018-09-17

## 2018-09-17 DIAGNOSIS — E78.5 HYPERLIPIDEMIA LDL GOAL <100: Primary | ICD-10-CM

## 2018-09-17 DIAGNOSIS — Z12.5 PROSTATE CANCER SCREENING: ICD-10-CM

## 2018-09-17 NOTE — TELEPHONE ENCOUNTER
Called pt- no answer, left VM informing him everything is set up and ordered.    Adeel STYLES RN

## 2018-09-17 NOTE — TELEPHONE ENCOUNTER
Reason for Call: Request for an order or referral:    Order or referral being requested: Fasting Labs     Date needed: before my next appointment    Has the patient been seen by the PCP for this problem? YES    Additional comments: Patient has px on 10/11/18     Phone number Patient can be reached at:  Home number on file [phone] (home)    Best Time:  Anytime     Can we leave a detailed message on this number?  YES    Call taken on 9/17/2018 at 9:06 AM by Shruthi Gordon

## 2018-09-25 DIAGNOSIS — E78.5 HYPERLIPIDEMIA LDL GOAL <100: ICD-10-CM

## 2018-09-25 DIAGNOSIS — Z12.5 PROSTATE CANCER SCREENING: ICD-10-CM

## 2018-09-25 LAB
ALBUMIN SERPL-MCNC: 3.8 G/DL (ref 3.4–5)
ALP SERPL-CCNC: 105 U/L (ref 40–150)
ALT SERPL W P-5'-P-CCNC: 32 U/L (ref 0–70)
ANION GAP SERPL CALCULATED.3IONS-SCNC: 7 MMOL/L (ref 3–14)
AST SERPL W P-5'-P-CCNC: 27 U/L (ref 0–45)
BILIRUB SERPL-MCNC: 0.9 MG/DL (ref 0.2–1.3)
BUN SERPL-MCNC: 20 MG/DL (ref 7–30)
CALCIUM SERPL-MCNC: 9 MG/DL (ref 8.5–10.1)
CHLORIDE SERPL-SCNC: 106 MMOL/L (ref 94–109)
CHOLEST SERPL-MCNC: 198 MG/DL
CO2 SERPL-SCNC: 29 MMOL/L (ref 20–32)
CREAT SERPL-MCNC: 0.98 MG/DL (ref 0.66–1.25)
ERYTHROCYTE [DISTWIDTH] IN BLOOD BY AUTOMATED COUNT: 14.2 % (ref 10–15)
GFR SERPL CREATININE-BSD FRML MDRD: 77 ML/MIN/1.7M2
GLUCOSE SERPL-MCNC: 87 MG/DL (ref 70–99)
HCT VFR BLD AUTO: 45.9 % (ref 40–53)
HDLC SERPL-MCNC: 52 MG/DL
HGB BLD-MCNC: 15.1 G/DL (ref 13.3–17.7)
LDLC SERPL CALC-MCNC: 123 MG/DL
MCH RBC QN AUTO: 30.1 PG (ref 26.5–33)
MCHC RBC AUTO-ENTMCNC: 32.9 G/DL (ref 31.5–36.5)
MCV RBC AUTO: 91 FL (ref 78–100)
NONHDLC SERPL-MCNC: 146 MG/DL
PLATELET # BLD AUTO: 245 10E9/L (ref 150–450)
POTASSIUM SERPL-SCNC: 4.7 MMOL/L (ref 3.4–5.3)
PROT SERPL-MCNC: 7.5 G/DL (ref 6.8–8.8)
RBC # BLD AUTO: 5.02 10E12/L (ref 4.4–5.9)
SODIUM SERPL-SCNC: 142 MMOL/L (ref 133–144)
TRIGL SERPL-MCNC: 117 MG/DL
WBC # BLD AUTO: 5.2 10E9/L (ref 4–11)

## 2018-09-25 PROCEDURE — 85027 COMPLETE CBC AUTOMATED: CPT | Performed by: INTERNAL MEDICINE

## 2018-09-25 PROCEDURE — 80053 COMPREHEN METABOLIC PANEL: CPT | Performed by: INTERNAL MEDICINE

## 2018-09-25 PROCEDURE — 36415 COLL VENOUS BLD VENIPUNCTURE: CPT | Performed by: INTERNAL MEDICINE

## 2018-09-25 PROCEDURE — G0103 PSA SCREENING: HCPCS | Performed by: INTERNAL MEDICINE

## 2018-09-25 PROCEDURE — 80061 LIPID PANEL: CPT | Performed by: INTERNAL MEDICINE

## 2018-09-25 NOTE — LETTER
"[facility]  [address] [facility]  [address]  [place]  [address]  Tel: [phone]    September 27, 2018    Vishmisael Draperberrys  [address]  [place] [address]        Dear Mr. Newsome,    The following letter pertains to your most recent diagnostic tests:    -Your PSA test is mildly elevated we can discuss this result further when I see you for your physical early next month.    -Liver and gallbladder tests are normal for you. (ALT,AST, Alk phos, bilirubin), kidney function is normal for you (Creatinine, GFR), Sodium is normal, Potassium is normal for you, Calcium is normal for you, Glucose (blood sugar) is normal for you.      -Your total cholesterol is 198 which is at your goal of total cholesterol less than 200.    -Your triglycerides are 117 which are at your goal of triglycerides less than 150.    -Your HDL or \"good cholesterol\" is 52 which is at your goal of HDL cholesterol greater than 40.    -Your LDL cholesterol or \"bad cholesterol\" is 123 which is at your goal of LDL cholesterol less than <130.  Your LDL goal is based on your risk factors for artery disease.     -Your complete blood counts including your hemoglobin returned normal for you.         Follow up:  We can discuss these results further when we see you in October for your physical appointment.        Sincerely,    Brian Tineo MD/[facility]          Enclosure: Lab Results  Results for orders placed or performed in visit on 09/25/18   Comprehensive metabolic panel   Result Value Ref Range    Sodium 142 133 - 144 mmol/L    Potassium 4.7 3.4 - 5.3 mmol/L    Chloride 106 94 - 109 mmol/L    Carbon Dioxide 29 20 - 32 mmol/L    Anion Gap 7 3 - 14 mmol/L    Glucose 87 70 - 99 mg/dL    Urea Nitrogen 20 7 - 30 mg/dL    Creatinine 0.98 0.66 - 1.25 mg/dL    GFR Estimate 77 >60 mL/min/1.7m2    GFR Estimate If Black >90 >60 mL/min/1.7m2    Calcium 9.0 8.5 - 10.1 mg/dL    Bilirubin Total 0.9 0.2 - 1.3 mg/dL    Albumin 3.8 3.4 - 5.0 g/dL    " Protein Total 7.5 6.8 - 8.8 g/dL    Alkaline Phosphatase 105 40 - 150 U/L    ALT 32 0 - 70 U/L    AST 27 0 - 45 U/L   CBC with platelets   Result Value Ref Range    WBC 5.2 4.0 - 11.0 10e9/L    RBC Count 5.02 4.4 - 5.9 10e12/L    Hemoglobin 15.1 13.3 - 17.7 g/dL    Hematocrit 45.9 40.0 - 53.0 %    MCV 91 78 - 100 fl    MCH 30.1 26.5 - 33.0 pg    MCHC 32.9 31.5 - 36.5 g/dL    RDW 14.2 10.0 - 15.0 %    Platelet Count 245 150 - 450 10e9/L   Lipid panel reflex to direct LDL Fasting   Result Value Ref Range    Cholesterol 198 <200 mg/dL    Triglycerides 117 <150 mg/dL    HDL Cholesterol 52 >39 mg/dL    LDL Cholesterol Calculated 123 (H) <100 mg/dL    Non HDL Cholesterol 146 (H) <130 mg/dL   Prostate spec antigen screen   Result Value Ref Range    PSA 7.65 (H) 0 - 4 ug/L

## 2018-09-26 LAB — PSA SERPL-ACNC: 7.65 UG/L (ref 0–4)

## 2018-09-26 NOTE — PROGRESS NOTES
"The following letter pertains to your most recent diagnostic tests:    -Your PSA test is mildly elevated we can discuss this result further when I see you for your physical early next month.    -Liver and gallbladder tests are normal for you. (ALT,AST, Alk phos, bilirubin), kidney function is normal for you (Creatinine, GFR), Sodium is normal, Potassium is normal for you, Calcium is normal for you, Glucose (blood sugar) is normal for you.      -Your total cholesterol is 198 which is at your goal of total cholesterol less than 200.    -Your triglycerides are 117 which are at your goal of triglycerides less than 150.    -Your HDL or \"good cholesterol\" is 52 which is at your goal of HDL cholesterol greater than 40.    -Your LDL cholesterol or \"bad cholesterol\" is 123 which is at your goal of LDL cholesterol less than <130.  Your LDL goal is based on your risk factors for artery disease.     -Your complete blood counts including your hemoglobin returned normal for you.         Follow up:  We can discuss these results further when we see you in October for your physical appointment.        Sincerely,    Dr. Tineo"

## 2018-10-11 ENCOUNTER — OFFICE VISIT (OUTPATIENT)
Dept: FAMILY MEDICINE | Facility: CLINIC | Age: 63
End: 2018-10-11
Payer: COMMERCIAL

## 2018-10-11 VITALS
SYSTOLIC BLOOD PRESSURE: 114 MMHG | WEIGHT: 163.7 LBS | BODY MASS INDEX: 22.92 KG/M2 | HEIGHT: 71 IN | DIASTOLIC BLOOD PRESSURE: 69 MMHG | OXYGEN SATURATION: 100 % | HEART RATE: 58 BPM

## 2018-10-11 DIAGNOSIS — Z00.00 ROUTINE GENERAL MEDICAL EXAMINATION AT A HEALTH CARE FACILITY: Primary | ICD-10-CM

## 2018-10-11 DIAGNOSIS — R97.20 ELEVATED PROSTATE SPECIFIC ANTIGEN (PSA): ICD-10-CM

## 2018-10-11 DIAGNOSIS — Z23 NEED FOR PROPHYLACTIC VACCINATION AND INOCULATION AGAINST INFLUENZA: ICD-10-CM

## 2018-10-11 PROCEDURE — 90686 IIV4 VACC NO PRSV 0.5 ML IM: CPT | Performed by: INTERNAL MEDICINE

## 2018-10-11 PROCEDURE — 99396 PREV VISIT EST AGE 40-64: CPT | Performed by: INTERNAL MEDICINE

## 2018-10-11 PROCEDURE — 90471 IMMUNIZATION ADMIN: CPT | Performed by: INTERNAL MEDICINE

## 2018-10-11 NOTE — NURSING NOTE
"Chief Complaint   Patient presents with     Physical        Initial /69 (BP Location: Right arm, Patient Position: Chair, Cuff Size: Adult Regular)  Pulse 58  Ht 5' 10.75\" (1.797 m)  Wt 163 lb 11.2 oz (74.3 kg)  SpO2 100%  BMI 22.99 kg/m2 Estimated body mass index is 22.99 kg/(m^2) as calculated from the following:    Height as of this encounter: 5' 10.75\" (1.797 m).    Weight as of this encounter: 163 lb 11.2 oz (74.3 kg)..    BP completed using cuff size: regular  MEDICATIONS REVIEWED  SOCIAL AND FAMILY HX REVIEWED  Margaret Mock CMA  "

## 2018-10-11 NOTE — PROGRESS NOTES
SUBJECTIVE:   CC: Vish Newsome is an 62 year old male who presents for preventative health visit.     Healthy Habits:    Do you get at least three servings of calcium containing foods daily (dairy, green leafy vegetables, etc.)? yes    Amount of exercise or daily activities, outside of work: 7 day(s) per week    Problems taking medications regularly No    Medication side effects: No    Have you had an eye exam in the past two years? yes    Do you see a dentist twice per year? yes    Do you have sleep apnea, excessive snoring or daytime drowsiness?yes           Today's PHQ-2 Score:   PHQ-2 ( 1999 [employer]) 10/11/2018 10/4/2017   Q1: Little interest or pleasure in doing things 0 0   Q2: Feeling down, depressed or hopeless 0 0   PHQ-2 Score 0 0   Q1: Little interest or pleasure in doing things - Not at all   Q2: Feeling down, depressed or hopeless - Not at all   PHQ-2 Score - 0       Abuse: Current or Past(Physical, Sexual or Emotional)- No  Do you feel safe in your environment - Yes    Social History   Substance Use Topics     Smoking status: Never Smoker     Smokeless tobacco: Never Used     Alcohol use No      If you drink alcohol do you typically have >3 drinks per day or >7 drinks per week? No                      Last PSA:   PSA   Date Value Ref Range Status   09/25/2018 7.65 (H) 0 - 4 ug/L Final     Comment:     Assay Method:  Chemiluminescence using Siemens Vista analyzer       Reviewed orders with patient. Reviewed health maintenance and updated orders accordingly - Yes  Patient Active Problem List   Diagnosis     Degeneration of lumbar intervertebral disc     Midline low back pain without sciatica     Preventive measure     Hyperlipidemia LDL goal <100     Chronic fatigue     Past Surgical History:   Procedure Laterality Date     ENT SURGERY  10/2016    nasal septal deviation       Social History   Substance Use Topics     Smoking status: Never Smoker     Smokeless tobacco: Never Used     Alcohol  "use No     Family History   Problem Relation Age of Onset     Family History Negative Mother      Family History Negative Father       age 85 (cardiac)         No current outpatient prescriptions on file.     No Known Allergies    Reviewed and updated as needed this visit by clinical staff  Tobacco  Allergies  Meds  Med Hx  Surg Hx  Fam Hx  Soc Hx        Reviewed and updated as needed this visit by Provider  Tobacco  Med Hx  Surg Hx  Fam Hx  Soc Hx       History reviewed. No pertinent past medical history.   Past Surgical History:   Procedure Laterality Date     ENT SURGERY  10/2016    nasal septal deviation       ROS:  A 10 organ systems ROS is negative.     OBJECTIVE:   /69 (BP Location: Right arm, Patient Position: Chair, Cuff Size: Adult Regular)  Pulse 58  Ht 5' 10.75\" (1.797 m)  Wt 163 lb 11.2 oz (74.3 kg)  SpO2 100%  BMI 22.99 kg/m2  EXAM:  GENERAL: healthy, alert and no distress  EYES: Eyes grossly normal to inspection, PERRL and conjunctivae and sclerae normal  HENT: ear canals and TM's normal, nose and mouth without ulcers or lesions  NECK: no adenopathy, no asymmetry, masses, or scars and thyroid normal to palpation  RESP: lungs clear to auscultation - no rales, rhonchi or wheezes  CV: regular rate and rhythm, normal S1 S2, no S3 or S4, no murmur, click or rub, no peripheral edema and peripheral pulses strong  ABDOMEN: soft, nontender, no hepatosplenomegaly, no masses and bowel sounds normal  RECTAL: normal sphincter tone, no rectal masses, prostate normal size, smooth, nontender without nodules or masses  MS: no gross musculoskeletal defects noted, no edema  SKIN: no suspicious lesions or rashes  NEURO: Normal strength and tone, mentation intact and speech normal  PSYCH: mentation appears normal, affect normal/bright    Diagnostic Test Results:  Results for orders placed or performed in visit on 18   Comprehensive metabolic panel   Result Value Ref Range    Sodium 142 133 - " 144 mmol/L    Potassium 4.7 3.4 - 5.3 mmol/L    Chloride 106 94 - 109 mmol/L    Carbon Dioxide 29 20 - 32 mmol/L    Anion Gap 7 3 - 14 mmol/L    Glucose 87 70 - 99 mg/dL    Urea Nitrogen 20 7 - 30 mg/dL    Creatinine 0.98 0.66 - 1.25 mg/dL    GFR Estimate 77 >60 mL/min/1.7m2    GFR Estimate If Black >90 >60 mL/min/1.7m2    Calcium 9.0 8.5 - 10.1 mg/dL    Bilirubin Total 0.9 0.2 - 1.3 mg/dL    Albumin 3.8 3.4 - 5.0 g/dL    Protein Total 7.5 6.8 - 8.8 g/dL    Alkaline Phosphatase 105 40 - 150 U/L    ALT 32 0 - 70 U/L    AST 27 0 - 45 U/L   CBC with platelets   Result Value Ref Range    WBC 5.2 4.0 - 11.0 10e9/L    RBC Count 5.02 4.4 - 5.9 10e12/L    Hemoglobin 15.1 13.3 - 17.7 g/dL    Hematocrit 45.9 40.0 - 53.0 %    MCV 91 78 - 100 fl    MCH 30.1 26.5 - 33.0 pg    MCHC 32.9 31.5 - 36.5 g/dL    RDW 14.2 10.0 - 15.0 %    Platelet Count 245 150 - 450 10e9/L   Lipid panel reflex to direct LDL Fasting   Result Value Ref Range    Cholesterol 198 <200 mg/dL    Triglycerides 117 <150 mg/dL    HDL Cholesterol 52 >39 mg/dL    LDL Cholesterol Calculated 123 (H) <100 mg/dL    Non HDL Cholesterol 146 (H) <130 mg/dL   Prostate spec antigen screen   Result Value Ref Range    PSA 7.65 (H) 0 - 4 ug/L       ASSESSMENT/PLAN:   1. Routine general medical examination at a health care facility  Healthy man   Labs reviewed      2. Elevated prostate specific antigen (PSA)  Recheck PSA in 3 months, consider urology consult if PSA higher at that interval; ongoing monitoring if stable at that interval    - PSA tumor marker; Future    COUNSELING:  Reviewed preventive health counseling, as reflected in patient instructions  Special attention given to:        Regular exercise       Healthy diet/nutrition       Immunizations    Vaccinated for: Influenza  ; shingrix recommended            Colon cancer screening; repeat 2026       Prostate cancer screening; see discussion above re elevated PSA    BP Readings from Last 1 Encounters:   10/11/18 114/69  "    Estimated body mass index is 22.99 kg/(m^2) as calculated from the following:    Height as of this encounter: 5' 10.75\" (1.797 m).    Weight as of this encounter: 163 lb 11.2 oz (74.3 kg).                reports that he has never smoked. He has never used smokeless tobacco.      Counseling Resources:  ATP IV Guidelines  Pooled Cohorts Equation Calculator  FRAX Risk Assessment  ICSI Preventive Guidelines  Dietary Guidelines for Americans, 2010  USDA's MyPlate  ASA Prophylaxis  Lung CA Screening    Brian Tineo MD  [facility]  "

## 2018-10-11 NOTE — MR AVS SNAPSHOT
"              After Visit Summary   10/11/2018    Vish Newsome    MRN: [id number]           Patient Information     Date Of Birth          1955        Visit Information        Provider Department      10/11/2018 5:30 PM Brian Tineo MD [facility]        Today's Diagnoses     Routine general medical examination at a health care facility    -  1    Elevated prostate specific antigen (PSA)          Care Instructions    You should get the new shingles vaccine \"SHINGRIX\" (not Zostavax) at your pharmacy.            Preventive Health Recommendations  Male Ages 50 - 64    Yearly exam:             See your health care provider every year in order to  o   Review health changes.   o   Discuss preventive care.    o   Review your medicines if your doctor has prescribed any.     Have a cholesterol test every 5 years, or more frequently if you are at risk for high cholesterol/heart disease.     Have a diabetes test (fasting glucose) every three years. If you are at risk for diabetes, you should have this test more often.     Have a colonoscopy at age 50, or have a yearly FIT test (stool test). These exams will check for colon cancer.      Talk with your health care provider about whether or not a prostate cancer screening test (PSA) is right for you.    You should be tested each year for STDs (sexually transmitted diseases), if you re at risk.     Shots: Get a flu shot each year. Get a tetanus shot every 10 years.     Nutrition:    Eat at least 5 servings of fruits and vegetables daily.     Eat whole-grain bread, whole-wheat pasta and brown rice instead of white grains and rice.     Get adequate Calcium and Vitamin D.     Lifestyle    Exercise for at least 150 minutes a week (30 minutes a day, 5 days a week). This will help you control your weight and prevent disease.     Limit alcohol to one drink per day.     No smoking.     Wear sunscreen to prevent skin cancer.     See your dentist every six months " "for an exam and cleaning.     See your eye doctor every 1 to 2 years.            Follow-ups after your visit        Follow-up notes from your care team     Return in about 3 months (around 2019) for Non-fasting Lab Only Visit (for PSA recheck).      Future tests that were ordered for you today     Open Future Orders        Priority Expected Expires Ordered    PSA tumor marker Routine  10/11/2019 10/11/2018            Who to contact     If you have questions or need follow up information about today's clinic visit or your schedule please contact [facility] directly at [phone].  Normal or non-critical lab and imaging results will be communicated to you by [employer], letter or phone within 4 business days after the clinic has received the results. If you do not hear from us within 7 days, please contact the clinic through [employer] or phone. If you have a critical or abnormal lab result, we will notify you by phone as soon as possible.  Submit refill requests through [employer] or call your pharmacy and they will forward the refill request to us. Please allow 3 business days for your refill to be completed.          Additional Information About Your Visit        MyChart Information     [employer] lets you send messages to your doctor, view your test results, renew your prescriptions, schedule appointments and more. To sign up, go to [email or web address] . Click on \"Log in\" on the left side of the screen, which will take you to the Welcome page. Then click on \"Sign up Now\" on the right side of the page.     You will be asked to enter the access code listed below, as well as some personal information. Please follow the directions to create your username and password.     Your access code is: [email or web address]  Expires: 2018  7:25 AM     Your access code will  in 90 days. If you need help or a new code, please call your [facility] or [phone].        Care EveryWhere ID     This is your Care " "EveryWhere ID. This could be used by other organizations to access your [place] medical records  [id number]        Your Vitals Were     Pulse Height Pulse Oximetry BMI (Body Mass Index)          58 5' 10.75\" (1.797 m) 100% 22.99 kg/m2         Blood Pressure from Last 3 Encounters:   10/11/18 114/69   10/04/17 118/64   04/28/17 110/70    Weight from Last 3 Encounters:   10/11/18 163 lb 11.2 oz (74.3 kg)   10/04/17 163 lb (73.9 kg)   04/28/17 163 lb (73.9 kg)               Primary Care Provider Office Phone # Fax #    Brian Tineo [phone] [address] [address]        Equal Access to Services     [facility] : Hadii amairani bullock hadasho Soomaali, waaxda luqadaha, qaybta kaalmada adeegyada, magalys peguero . So [facility] [place] [facility] [phone].    ATENCIÓN: Si habla español, tiene a fabian disposición servicios gratuitos de asistencia lingüística. Jesus al [phone].    We comply with applicable federal civil rights laws and [place] laws. We do not discriminate on the basis of race, color, national origin, age, disability, sex, sexual orientation, or gender identity.            Thank you!     Thank you for choosing [facility]  for your care. Our goal is always to provide you with excellent care. Hearing back from our patients is one way we can continue to improve our services. Please take a few minutes to complete the written survey that you may receive in the mail after your visit with us. Thank you!             Your Updated Medication List - Protect others around you: Learn how to safely use, store and throw away your medicines at www.disposemymeds.org.      Notice  As of 10/11/2018  6:17 PM    You have not been prescribed any medications.      "

## 2018-10-11 NOTE — PROGRESS NOTES

## 2018-10-11 NOTE — PATIENT INSTRUCTIONS
"You should get the new shingles vaccine \"SHINGRIX\" (not Zostavax) at your pharmacy.            Preventive Health Recommendations  Male Ages 50 - 64    Yearly exam:             See your health care provider every year in order to  o   Review health changes.   o   Discuss preventive care.    o   Review your medicines if your doctor has prescribed any.     Have a cholesterol test every 5 years, or more frequently if you are at risk for high cholesterol/heart disease.     Have a diabetes test (fasting glucose) every three years. If you are at risk for diabetes, you should have this test more often.     Have a colonoscopy at age 50, or have a yearly FIT test (stool test). These exams will check for colon cancer.      Talk with your health care provider about whether or not a prostate cancer screening test (PSA) is right for you.    You should be tested each year for STDs (sexually transmitted diseases), if you re at risk.     Shots: Get a flu shot each year. Get a tetanus shot every 10 years.     Nutrition:    Eat at least 5 servings of fruits and vegetables daily.     Eat whole-grain bread, whole-wheat pasta and brown rice instead of white grains and rice.     Get adequate Calcium and Vitamin D.     Lifestyle    Exercise for at least 150 minutes a week (30 minutes a day, 5 days a week). This will help you control your weight and prevent disease.     Limit alcohol to one drink per day.     No smoking.     Wear sunscreen to prevent skin cancer.     See your dentist every six months for an exam and cleaning.     See your eye doctor every 1 to 2 years.    "

## 2018-11-26 ENCOUNTER — OFFICE VISIT (OUTPATIENT)
Dept: FAMILY MEDICINE | Facility: CLINIC | Age: 63
End: 2018-11-26
Payer: COMMERCIAL

## 2018-11-26 VITALS
WEIGHT: 164 LBS | DIASTOLIC BLOOD PRESSURE: 71 MMHG | SYSTOLIC BLOOD PRESSURE: 135 MMHG | HEIGHT: 71 IN | OXYGEN SATURATION: 100 % | HEART RATE: 70 BPM | BODY MASS INDEX: 22.96 KG/M2

## 2018-11-26 DIAGNOSIS — R97.20 ELEVATED PROSTATE SPECIFIC ANTIGEN (PSA): ICD-10-CM

## 2018-11-26 DIAGNOSIS — H61.23 BILATERAL IMPACTED CERUMEN: ICD-10-CM

## 2018-11-26 DIAGNOSIS — H93.13 TINNITUS, BILATERAL: ICD-10-CM

## 2018-11-26 DIAGNOSIS — H91.93 BILATERAL HEARING LOSS, UNSPECIFIED HEARING LOSS TYPE: Primary | ICD-10-CM

## 2018-11-26 DIAGNOSIS — H54.7 VISION PROBLEM: ICD-10-CM

## 2018-11-26 PROCEDURE — 99213 OFFICE O/P EST LOW 20 MIN: CPT | Mod: 25 | Performed by: INTERNAL MEDICINE

## 2018-11-26 PROCEDURE — 69210 REMOVE IMPACTED EAR WAX UNI: CPT | Mod: LT | Performed by: INTERNAL MEDICINE

## 2018-11-26 NOTE — PROGRESS NOTES
"  SUBJECTIVE:   Vish Newsome is a 63 year old male who presents to clinic today for the following health issues:      Concern - Ear Problem   Onset: x few weeks     Description:   Ringing in the ears, pt denies any ear pain.     Intensity: moderate to severe.     Progression of Symptoms:  same and constant    Accompanying Signs & Symptoms:  None     Previous history of similar problem:   None    Precipitating factors:   Worsened by: None    Alleviating factors:  Improved by: None     Therapies Tried and outcome: Has not been seen for this in the past.     No vertigo or other focal weakness or numbness  Had septoplasty with Dr. Camarillo in remote past     Problem list and histories reviewed & adjusted, as indicated.  Additional history: as documented    Patient Active Problem List   Diagnosis     Degeneration of lumbar intervertebral disc     Midline low back pain without sciatica     Preventive measure     Hyperlipidemia LDL goal <100     Chronic fatigue     Past Surgical History:   Procedure Laterality Date     ENT SURGERY  10/2016    nasal septal deviation       Social History   Substance Use Topics     Smoking status: Never Smoker     Smokeless tobacco: Never Used     Alcohol use No     Family History   Problem Relation Age of Onset     Family History Negative Mother      Family History Negative Father       age 85 (cardiac)         No current outpatient prescriptions on file.     No Known Allergies    Reviewed and updated as needed this visit by clinical staff       Reviewed and updated as needed this visit by Provider         ROS:  No fevers or chills, headaches, no nasal congestion, no rhinorrhea ; he has had more floaters lately and wants a referral to [facility] where he usually has eye checks     OBJECTIVE:     /71  Pulse 70  Ht 5' 10.75\" (1.797 m)  Wt 164 lb (74.4 kg)  SpO2 100%  BMI 23.04 kg/m2  Body mass index is 23.04 kg/(m^2).  GENERAL: healthy, alert and no distress  EYES: " Eyes grossly normal to inspection, PERRL and conjunctivae and sclerae normal  HENT: ear canals and TM's normal after removal of large cerumen impactions from both ears by Dr. Tineo using a curette, nose and mouth without ulcers or lesions  NECK: no adenopathy, no asymmetry, masses, or scars and thyroid normal to palpation  NEURO: Normal strength and tone, mentation intact and speech normal  PSYCH: mentation appears normal, affect normal/bright    Diagnostic Test Results:  none     ASSESSMENT/PLAN:       1. Bilateral hearing loss, unspecified hearing loss type  Check hearing test at Dr. Camarillo's clinic ; discussed hearing protection   - OTOLARYNGOLOGY REFERRAL    2. Tinnitus, bilateral  Discussed white noise to help symptoms  - OTOLARYNGOLOGY REFERRAL    3. Bilateral impacted cerumen  Discussed debrox in future if needed   - OTOLARYNGOLOGY REFERRAL  - REMOVE IMPACTED CERUMEN    4. Vision problem  Referral to eye clinic for further evaluation  - OPHTHALMOLOGY ADULT REFERRAL    5. Elevated prostate specific antigen (PSA)  Return to lab next month to recheck           Brian Tineo MD  [facility]

## 2018-11-26 NOTE — MR AVS SNAPSHOT
After Visit Summary   11/26/2018    Vish Newsome    MRN: [id number]           Patient Information     Date Of Birth          1955        Visit Information        Provider Department      11/26/2018 9:00 AM Brian Tineo MD [facility]        Today's Diagnoses     Bilateral hearing loss, unspecified hearing loss type    -  1    Tinnitus, bilateral        Bilateral impacted cerumen           Follow-ups after your visit        Additional Services     OTOLARYNGOLOGY REFERRAL       Your provider has referred you to: [facility]: [facility] [place] [phone]   [email or web address]    Dr. Shankar Camarillo (Ear, Nose, Throat doctor)    Please be aware that coverage of these services is subject to the terms and limitations of your health insurance plan.  Call member services at your health plan with any benefit or coverage questions.      Please bring the following with you to your appointment:    (1) Any X-Rays, CTs or MRIs which have been performed.  Contact the facility where they were done to arrange for  prior to your scheduled appointment.   (2) List of current medications  (3) This referral request   (4) Any documents/labs given to you for this referral                  Follow-up notes from your care team     Return in about 1 month (around 12/26/2018) for Non-fasting Lab Only Visit for PSA.      Who to contact     If you have questions or need follow up information about today's clinic visit or your schedule please contact [facility] directly at [phone].  Normal or non-critical lab and imaging results will be communicated to you by MyChart, letter or phone within 4 business days after the clinic has received the results. If you do not hear from us within 7 days, please contact the clinic through MyChart or phone. If you have a critical or abnormal lab result, we will notify you by phone as soon as possible.  Submit refill  "requests through [employer] or call your pharmacy and they will forward the refill request to us. Please allow 3 business days for your refill to be completed.          Additional Information About Your Visit        Care EveryWhere ID     This is your Care EveryWhere ID. This could be used by other organizations to access your [place] medical records  [id number]        Your Vitals Were     Pulse Height Pulse Oximetry BMI (Body Mass Index)          70 5' 10.75\" (1.797 m) 100% 23.04 kg/m2         Blood Pressure from Last 3 Encounters:   11/26/18 135/71   10/11/18 114/69   10/04/17 118/64    Weight from Last 3 Encounters:   11/26/18 164 lb (74.4 kg)   10/11/18 163 lb 11.2 oz (74.3 kg)   10/04/17 163 lb (73.9 kg)              We Performed the Following     OTOLARYNGOLOGY REFERRAL     REMOVE IMPACTED CERUMEN        Primary Care Provider Office Phone # Fax #    Brian Tineo [phone] [address] [address]        Equal Access to Services     [facility]: Hadii aad ku hadasho Soomaali, waaxda luqadaha, qaybta kaalmada adejuan pabloyada, magalys peguero . So [facility] [phone].    ATENCIÓN: Si habla español, tiene a fabian disposición servicios gratuitos de asistencia lingüística. Jesus al [phone].    We comply with applicable federal civil rights laws and [place] laws. We do not discriminate on the basis of race, color, national origin, age, disability, sex, sexual orientation, or gender identity.            Thank you!     Thank you for choosing [facility]  for your care. Our goal is always to provide you with excellent care. Hearing back from our patients is one way we can continue to improve our services. Please take a few minutes to complete the written survey that you may receive in the mail after your visit with us. Thank you!             Your Updated Medication List - Protect others around you: Learn how to safely use, store and throw " away your medicines at www.disposemymeds.org.      Notice  As of 11/26/2018  9:37 AM    You have not been prescribed any medications.

## 2018-11-26 NOTE — NURSING NOTE
"Chief Complaint   Patient presents with     Ear Problem       Initial /71  Pulse 70  Ht 5' 10.75\" (1.797 m)  Wt 164 lb (74.4 kg)  SpO2 100%  BMI 23.04 kg/m2 Estimated body mass index is 23.04 kg/(m^2) as calculated from the following:    Height as of this encounter: 5' 10.75\" (1.797 m).    Weight as of this encounter: 164 lb (74.4 kg).  BP completed using cuff size: regular    Health Maintenance Due   Topic Date Due     HIV SCREEN (SYSTEM ASSIGNED)  11/04/1973     ADVANCE DIRECTIVE PLANNING Q5 YRS  11/04/2010         Stephany Ball MA 11/26/18        "

## 2018-12-12 ENCOUNTER — TRANSFERRED RECORDS (OUTPATIENT)
Dept: HEALTH INFORMATION MANAGEMENT | Facility: CLINIC | Age: 63
End: 2018-12-12

## 2018-12-26 DIAGNOSIS — R97.20 ELEVATED PROSTATE SPECIFIC ANTIGEN (PSA): ICD-10-CM

## 2018-12-26 DIAGNOSIS — R97.20 ELEVATED PROSTATE SPECIFIC ANTIGEN (PSA): Primary | ICD-10-CM

## 2018-12-26 LAB — PSA SERPL-MCNC: 7.33 UG/L (ref 0–4)

## 2018-12-26 PROCEDURE — 84153 ASSAY OF PSA TOTAL: CPT | Performed by: INTERNAL MEDICINE

## 2018-12-26 PROCEDURE — 36415 COLL VENOUS BLD VENIPUNCTURE: CPT | Performed by: INTERNAL MEDICINE

## 2018-12-26 NOTE — LETTER
[place] [address] [address] [facility]  [address]  [place]  [address]  Tel: [phone]    December 27, 2018    Vish Newsome  [address] [facility] [address]        Dear Ivory Mercedez,    The following letter pertains to your most recent diagnostic tests:     The PSA result remains stable.  It does remain mildly elevated above the normal range.  One option is to discuss further evaluation with a urologist.  Sometimes, prostate MRI or other more sophisticated laboratory tests can be helpful to risk stratify for prostate cancer when the PSA is only mildly elevated but persistently mildly elevated.  I would recommend a Dr. Prieto Bernal at the urology clinic across the street from my clinic.  Please call [facility] [phone] ([email or web address]) to schedule an appointment with urology.       If you have any further questions or problems, please contact our office.      Sincerely,    Brian Tineo MD/keli          Enclosure: Lab Results      Results for orders placed or performed in visit on 12/26/18   PSA tumor marker   Result Value Ref Range    PSA 7.33 (H) 0 - 4 ug/L

## 2018-12-27 NOTE — RESULT ENCOUNTER NOTE
The following letter pertains to your most recent diagnostic tests:    The PSA result remains stable.  It does remain mildly elevated above the normal range.  One option is to discuss further evaluation with a urologist.  Sometimes, prostate MRI or other more sophisticated laboratory tests can be helpful to risk stratify for prostate cancer when the PSA is only mildly elevated but persistently mildly elevated.  I would recommend a Dr. Prieto Bernal at the urology clinic across the street from my clinic.  Please call [facility] [place] [phone] ([email or web address] to schedule an appointment with urology.          Sincerely,    Dr. Tineo

## 2019-05-04 ENCOUNTER — TRANSFERRED RECORDS (OUTPATIENT)
Dept: HEALTH INFORMATION MANAGEMENT | Facility: CLINIC | Age: 64
End: 2019-05-04

## 2019-09-26 ENCOUNTER — TRANSFERRED RECORDS (OUTPATIENT)
Dept: HEALTH INFORMATION MANAGEMENT | Facility: CLINIC | Age: 64
End: 2019-09-26

## 2019-11-18 ENCOUNTER — OFFICE VISIT (OUTPATIENT)
Dept: FAMILY MEDICINE | Facility: CLINIC | Age: 64
End: 2019-11-18
Payer: COMMERCIAL

## 2019-11-18 VITALS
DIASTOLIC BLOOD PRESSURE: 75 MMHG | HEART RATE: 69 BPM | TEMPERATURE: 97.6 F | BODY MASS INDEX: 23.95 KG/M2 | WEIGHT: 167.3 LBS | OXYGEN SATURATION: 100 % | HEIGHT: 70 IN | SYSTOLIC BLOOD PRESSURE: 136 MMHG

## 2019-11-18 DIAGNOSIS — E78.5 HYPERLIPIDEMIA LDL GOAL <100: ICD-10-CM

## 2019-11-18 DIAGNOSIS — Z00.00 ROUTINE GENERAL MEDICAL EXAMINATION AT A HEALTH CARE FACILITY: Primary | ICD-10-CM

## 2019-11-18 DIAGNOSIS — Z11.4 ENCOUNTER FOR SCREENING FOR HIV: ICD-10-CM

## 2019-11-18 DIAGNOSIS — R97.20 ELEVATED PROSTATE SPECIFIC ANTIGEN (PSA): ICD-10-CM

## 2019-11-18 PROCEDURE — 99213 OFFICE O/P EST LOW 20 MIN: CPT | Mod: 25 | Performed by: INTERNAL MEDICINE

## 2019-11-18 PROCEDURE — 99396 PREV VISIT EST AGE 40-64: CPT | Performed by: INTERNAL MEDICINE

## 2019-11-18 ASSESSMENT — MIFFLIN-ST. JEOR: SCORE: 1550.12

## 2019-11-18 NOTE — PROGRESS NOTES
SUBJECTIVE:   CC: Vish Newsome is an 64 year old male who presents for preventative health visit.     Healthy Habits:     Getting at least 3 servings of Calcium per day:  NO    Bi-annual eye exam:  Yes    Dental care twice a year:  Yes    Sleep apnea or symptoms of sleep apnea:  None    Diet:  Regular (no restrictions)    Frequency of exercise:  2-3 days/week    Duration of exercise:  Greater than 60 minutes    Taking medications regularly:  Not Applicable    Barriers to taking medications:  Not applicable    Medication side effects:  Not applicable    PHQ-2 Total Score: 0    Additional concerns today:  No    Today's PHQ-2 Score:   PHQ-2 ( 1999 [employer]) 11/18/2019   Q1: Little interest or pleasure in doing things 0   Q2: Feeling down, depressed or hopeless 0   PHQ-2 Score 0   Q1: Little interest or pleasure in doing things -   Q2: Feeling down, depressed or hopeless -   PHQ-2 Score -       Abuse: Current or Past(Physical, Sexual or Emotional)- pt declined to answer   Do you feel safe in your environment? Pt declined to answer      Have you ever done Advance Care Planning? (For example, a Health Directive, POLST, or a discussion with a medical provider or your loved ones about your wishes): Yes, advance care planning is on file.    Social History     Tobacco Use     Smoking status: Never Smoker     Smokeless tobacco: Never Used   Substance Use Topics     Alcohol use: No      - pt declined to answer     Alcohol Use 10/4/2017   Prescreen: >3 drinks/day or >7 drinks/week? The patient does not drink >3 drinks per day nor >7 drinks per week.       Last PSA:   PSA   Date Value Ref Range Status   12/26/2018 7.33 (H) 0 - 4 ug/L Final     Comment:     Assay Method:  Chemiluminescence using Siemens Vista analyzer       Reviewed orders with patient. Reviewed health maintenance and updated orders accordingly - Yes  Patient Active Problem List   Diagnosis     Degeneration of lumbar intervertebral disc     Midline low back  "pain without sciatica     Preventive measure     Hyperlipidemia LDL goal <100     Chronic fatigue     Elevated prostate specific antigen (PSA)     Past Surgical History:   Procedure Laterality Date     ENT SURGERY  10/2016    nasal septal deviation       Social History     Tobacco Use     Smoking status: Never Smoker     Smokeless tobacco: Never Used   Substance Use Topics     Alcohol use: No     Family History   Problem Relation Age of Onset     Family History Negative Mother      Family History Negative Father          age 85 (cardiac)         No current outpatient medications on file.     No Known Allergies    Reviewed and updated as needed this visit by clinical staff  Tobacco  Allergies  Meds         Reviewed and updated as needed this visit by Provider        No past medical history on file.   Past Surgical History:   Procedure Laterality Date     ENT SURGERY  10/2016    nasal septal deviation       Review of Systems  A 10 organ systems ROS is negative.     OBJECTIVE:   /75 (BP Location: Right arm, Patient Position: Sitting, Cuff Size: Adult Regular)   Pulse 69   Temp 97.6  F (36.4  C) (Oral)   Ht 1.77 m (5' 9.69\")   Wt 75.9 kg (167 lb 4.8 oz)   SpO2 100%   BMI 24.22 kg/m      Physical Exam  GENERAL: healthy, alert and no distress  EYES: Eyes grossly normal to inspection, PERRL and conjunctivae and sclerae normal  HENT: ear canals and TM's normal, nose and mouth without ulcers or lesions  NECK: no adenopathy, no asymmetry, masses, or scars and thyroid normal to palpation  RESP: lungs clear to auscultation - no rales, rhonchi or wheezes  CV: regular rate and rhythm, normal S1 S2, no S3 or S4, no murmur, click or rub, no peripheral edema and peripheral pulses strong  ABDOMEN: soft, nontender, no hepatosplenomegaly, no masses and bowel sounds normal  RECTAL: normal sphincter tone, no rectal masses, prostate mildly large firm on right side,   MS: no gross musculoskeletal defects noted, no " "edema  SKIN: no suspicious lesions or rashes  NEURO: Normal strength and tone, mentation intact and speech normal  PSYCH: mentation appears normal, affect normal/bright    Lab pending     ASSESSMENT/PLAN:   1. Routine general medical examination at a health care facility      2. Elevated prostate specific antigen (PSA)  Discussed options urology referral vs. Prostate MRI, he is reluctant to see urology because he does not want a biopsy after reading a book called \"the prostate hoax\".  I discussed his mildly asymmetric prostate exam.  I asked him to at least get the MRI to further risk stratify.  I told him he may need a biopsy pending the MRI result.  I also tried to address some of his concerns regarding the biopsy and explain how the biopsy can differentiate between low grade and higher grade prostate cancers.  He asked good questions and wants to proceed with the MRI.    - PSA tumor marker; Future  - MR Prostate wo & w Contrast; Future  - OFFICE/OUTPT VISIT,EST,LEVL III    3. Hyperlipidemia LDL goal <100    - Lipid panel reflex to direct LDL Fasting; Future  - Comprehensive metabolic panel; Future  - CBC with platelets; Future    4. Encounter for screening for HIV    - HIV Screening; Future    COUNSELING:   Reviewed preventive health counseling, as reflected in patient instructions  Special attention given to:        Regular exercise       Healthy diet/nutrition       Immunizations    Vaccinates are up to date              Consider Hep C screening for patients born between 1945 and 1965; done        HIV screeninx in teen years, 1x in adult years, and at intervals if high risk; done        Colon cancer screening; repeat in        Prostate cancer screening; referred for prostate MRI       Consider lung cancer screening for ages 55-80 years and 30 pack-year smoking history   ; never smoker     Estimated body mass index is 24.22 kg/m  as calculated from the following:    Height as of this encounter: 1.77 m " "(5' 9.69\").    Weight as of this encounter: 75.9 kg (167 lb 4.8 oz).          reports that he has never smoked. He has never used smokeless tobacco.      Counseling Resources:  ATP IV Guidelines  Pooled Cohorts Equation Calculator  FRAX Risk Assessment  ICSI Preventive Guidelines  Dietary Guidelines for Americans, 2010  USDA's MyPlate  ASA Prophylaxis  Lung CA Screening    Brian Tineo MD  [facility]  "

## 2019-11-25 DIAGNOSIS — E78.5 HYPERLIPIDEMIA LDL GOAL <100: ICD-10-CM

## 2019-11-25 DIAGNOSIS — R97.20 ELEVATED PROSTATE SPECIFIC ANTIGEN (PSA): ICD-10-CM

## 2019-11-25 DIAGNOSIS — Z11.4 ENCOUNTER FOR SCREENING FOR HIV: ICD-10-CM

## 2019-11-25 LAB
ALBUMIN SERPL-MCNC: 3.8 G/DL (ref 3.4–5)
ALP SERPL-CCNC: 103 U/L (ref 40–150)
ALT SERPL W P-5'-P-CCNC: 39 U/L (ref 0–70)
ANION GAP SERPL CALCULATED.3IONS-SCNC: 8 MMOL/L (ref 3–14)
AST SERPL W P-5'-P-CCNC: 29 U/L (ref 0–45)
BILIRUB SERPL-MCNC: 0.8 MG/DL (ref 0.2–1.3)
BUN SERPL-MCNC: 17 MG/DL (ref 7–30)
CALCIUM SERPL-MCNC: 9.4 MG/DL (ref 8.5–10.1)
CHLORIDE SERPL-SCNC: 110 MMOL/L (ref 94–109)
CHOLEST SERPL-MCNC: 219 MG/DL
CO2 SERPL-SCNC: 25 MMOL/L (ref 20–32)
CREAT SERPL-MCNC: 1.04 MG/DL (ref 0.66–1.25)
ERYTHROCYTE [DISTWIDTH] IN BLOOD BY AUTOMATED COUNT: 14.2 % (ref 10–15)
GFR SERPL CREATININE-BSD FRML MDRD: 75 ML/MIN/{1.73_M2}
GLUCOSE SERPL-MCNC: 87 MG/DL (ref 70–99)
HCT VFR BLD AUTO: 46.9 % (ref 40–53)
HDLC SERPL-MCNC: 54 MG/DL
HGB BLD-MCNC: 15.6 G/DL (ref 13.3–17.7)
LDLC SERPL CALC-MCNC: 145 MG/DL
MCH RBC QN AUTO: 30.5 PG (ref 26.5–33)
MCHC RBC AUTO-ENTMCNC: 33.3 G/DL (ref 31.5–36.5)
MCV RBC AUTO: 92 FL (ref 78–100)
NONHDLC SERPL-MCNC: 165 MG/DL
PLATELET # BLD AUTO: 260 10E9/L (ref 150–450)
POTASSIUM SERPL-SCNC: 4.6 MMOL/L (ref 3.4–5.3)
PROT SERPL-MCNC: 7.2 G/DL (ref 6.8–8.8)
PSA SERPL-MCNC: 9.06 UG/L (ref 0–4)
RBC # BLD AUTO: 5.12 10E12/L (ref 4.4–5.9)
SODIUM SERPL-SCNC: 143 MMOL/L (ref 133–144)
TRIGL SERPL-MCNC: 100 MG/DL
WBC # BLD AUTO: 5.5 10E9/L (ref 4–11)

## 2019-11-25 PROCEDURE — 84153 ASSAY OF PSA TOTAL: CPT | Performed by: INTERNAL MEDICINE

## 2019-11-25 PROCEDURE — 87389 HIV-1 AG W/HIV-1&-2 AB AG IA: CPT | Performed by: INTERNAL MEDICINE

## 2019-11-25 PROCEDURE — 80053 COMPREHEN METABOLIC PANEL: CPT | Performed by: INTERNAL MEDICINE

## 2019-11-25 PROCEDURE — 36415 COLL VENOUS BLD VENIPUNCTURE: CPT | Performed by: INTERNAL MEDICINE

## 2019-11-25 PROCEDURE — 85027 COMPLETE CBC AUTOMATED: CPT | Performed by: INTERNAL MEDICINE

## 2019-11-25 PROCEDURE — 80061 LIPID PANEL: CPT | Performed by: INTERNAL MEDICINE

## 2019-11-25 NOTE — LETTER
"[facility]  [address] [facility]  [address]  [place]  [address]  Tel: [phone]    November 26, 2019    Vishmisael Draperberrys  [address]  [place] [address]        Dear Mr. Newsome,    The following letter pertains to your most recent diagnostic tests:    -Your HIV test is negative.     -The PSA continues to climb.  This is very suspicious for prostate cancer.       -Your total cholesterol is 219 which is above your goal of total cholesterol less than 200.    -Your triglycerides are 100 which are at your goal of triglycerides less than 150.    -Your HDL or \"good cholesterol\" is 54 which is at your goal of HDL cholesterol greater than 40.    -Your LDL cholesterol or \"bad cholesterol\" is 145 which is above your goal of LDL cholesterol less than <130.  Your LDL goal is based on your risk factors for artery disease.     -Liver and gallbladder tests are normal for you. (ALT,AST, Alk phos, bilirubin), kidney function is normal for you (Creatinine, GFR), Sodium is normal, Potassium is normal for you, Calcium is normal for you, Glucose (blood sugar) is normal for you.      -Your complete blood counts including your hemoglobin returned normal for you.         Bottom line:      1.  The PSA continues to elevate.  I am concerned for prostate cancer.  I think the MRI will add useful information, but I recommend that you consult with a urologist to discuss the results and appropriate next steps.  I recommend a Dr. Dewitt or Dr. Hampton.  Please call [facility] [phone] ([email or web address] to schedule an appointment with either of those providers.       2.  The cholesterol is a little higher than last check.  You can reduce your total and LDL cholesterol levels by eating less saturated fats.  This means you should eat less fried foods and meat.  If you eat meat, you should try to eat more chicken and fish and less beef or pork.  Also, you should increase " dietary fiber intake by eating more fruits, vegetables and whole grains.  A diet high in fiber reduces total and LDL cholesterol levels. We should recheck in one year.   I think you can get this down to where it has been in the past without using medications.          Follow up:  Schedule your prostate MRI as soon as you can and schedule an appointment with a urologist to discuss the results and next steps.   Even if you have prostate cancer, it is entirely possible that surgery or radiation may not be necessary, but I think you should at least get a solid diagnosis so you know the prognosis and can exercise all your options.       Schedule an appointment with me for a physical examination with fasting blood tests in one year's time, or return sooner if new questions, symptoms or problems arise.       Sincerely,    Dr. Tineo/LAN    The 10-year ASCVD risk score (Mariondiann KOTHARI JrIvory, et al., 2013) is: 13.3%/        Enclosure: Lab Results  Results for orders placed or performed in visit on 11/25/19   HIV Screening     Status: None   Result Value Ref Range    HIV Antigen Antibody Combo Nonreactive NR^Nonreactive       Lipid panel reflex to direct LDL Fasting     Status: Abnormal   Result Value Ref Range    Cholesterol 219 (H) <200 mg/dL    Triglycerides 100 <150 mg/dL    HDL Cholesterol 54 >39 mg/dL    LDL Cholesterol Calculated 145 (H) <100 mg/dL    Non HDL Cholesterol 165 (H) <130 mg/dL   Comprehensive metabolic panel     Status: Abnormal   Result Value Ref Range    Sodium 143 133 - 144 mmol/L    Potassium 4.6 3.4 - 5.3 mmol/L    Chloride 110 (H) 94 - 109 mmol/L    Carbon Dioxide 25 20 - 32 mmol/L    Anion Gap 8 3 - 14 mmol/L    Glucose 87 70 - 99 mg/dL    Urea Nitrogen 17 7 - 30 mg/dL    Creatinine 1.04 0.66 - 1.25 mg/dL    GFR Estimate 75 >60 mL/min/[1.73_m2]    GFR Estimate If Black 87 >60 mL/min/[1.73_m2]    Calcium 9.4 8.5 - 10.1 mg/dL    Bilirubin Total 0.8 0.2 - 1.3 mg/dL    Albumin 3.8 3.4 - 5.0 g/dL    Protein Total  7.2 6.8 - 8.8 g/dL    Alkaline Phosphatase 103 40 - 150 U/L    ALT 39 0 - 70 U/L    AST 29 0 - 45 U/L   CBC with platelets     Status: None   Result Value Ref Range    WBC 5.5 4.0 - 11.0 10e9/L    RBC Count 5.12 4.4 - 5.9 10e12/L    Hemoglobin 15.6 13.3 - 17.7 g/dL    Hematocrit 46.9 40.0 - 53.0 %    MCV 92 78 - 100 fl    MCH 30.5 26.5 - 33.0 pg    MCHC 33.3 31.5 - 36.5 g/dL    RDW 14.2 10.0 - 15.0 %    Platelet Count 260 150 - 450 10e9/L   PSA tumor marker     Status: Abnormal   Result Value Ref Range    PSA 9.06 (H) 0 - 4 ug/L

## 2019-11-26 LAB — HIV 1+2 AB+HIV1 P24 AG SERPL QL IA: NONREACTIVE

## 2021-03-24 NOTE — PROGRESS NOTES
"Vish Newsome is a 65 year old male being evaluated via a billable telephone visit.     \"This telephone visit will be conducted via a call between you and your physician/provider. We have found that certain health care needs can be provided without the need for an in-person visit or physical exam.  This service lets us provide the care you need with a telephone conversation.  If a prescription is necessary we can send it directly to your pharmacy.  If lab work is needed we can place an order for that and you can then stop by our lab to have the test done at a later time.\"    Telephone visits are billed at different rates depending on your insurance coverage.  Please reach out to your insurance provider with any questions.    Patient has given verbal consent for  a Telephone visit? Yes    What telephone number would you like your provider to contact at at:  [phone]    How would you like to obtain your AVS? Mail a copy    Misael Schafer MA    SLEEP MEDICINE CONSULTATION  Sleep Psychology    Name: Vish Newsome MRN# [id number]   Age: 65 year old YOB: 1955     Date of Consultation: Mar 25, 2021  Consultation is requested by: No referring provider defined for this encounter.  Primary care provider: Brian Tineo    Reason for Sleep Consultation     Vish Newsome is a 65 year old male seen today for a behavioral sleep medicine consultation because of insomnia    Assessment and Plan     Sleep Diagnoses/Recommendations:    Chronic insomnia    This patient was seen for behavioral sleep medicine consultation after an initial consultation completed in November 2016.  Sleep study also completed in 2016 did not indicate evidence for obstructive sleep apnea or other intrinsic sleep disorder.  Patient has not reported any significant weight change or other symptoms suggesting an elevated pretest risk for obstructive sleep apnea.  A repeat sleep study is not indicated.    We discussed " possible treatments for about chronic insomnia including the first-line recommended cognitive behavioral therapy for insomnia, sedative-hypnotics and other complementary interventions/treatments.  Patient is elected to pursue cognitive behavioral therapy for insomnia.  He has also been provided a prescription for trazodone by his primary care physician which she has declined at this point to pursue.    After reviewing patient's baseline sleep data, we will utilize sleep consolidation therapy which involves reducing the patient's average time in bed of 9.5hours to 7 hours in bed.  The goal of this is to increase homeostatic sleep drive and also establish a consistent wake/sleep offset time.  We will also incorporate stimulus control strategies to help better align the bed with sleep.  He will thus avoid extended time in bed reading or spending time awake in the middle of the night.  These strategies are outlined in his after visit summary which was emailed to him as he does not have my chart.  He will continue to avoid caffeine in the afternoon and evenings and avoid evening alcohol use.  He states that his bedroom remains quiet and comfortable as well as dark.  He was advised to initiate use of an alarm to reduce the significant sleep offset variation he has, now about 1.5 hours I recommended sleep window to start between 1130 PM - :630 AM.    Summary Counseling:      Vish was provided information about the pathophysiology of insomnia, psychophysiological factors contributing to the onset and maintenance of insomnia and how co-occurring medical conditions and intrinsic sleep disorders can affect sleep.  Treatment options were discussed including component of cognitive-behavioral therapy for insomnia as applicable to patient specific sleep concerns and symptoms. The benefits and potential early side effects of treatment including increased daytime sleepiness were discussed.     Patient was advised to consult with  "their prescribing provider around use of or changes to prescription sleep medication.  Patient was counseled on the importance of avoiding driving if drowsy.    See patient instructions for initial treatment recommendations and behavioral sleep plan.    Follow-up: 4 weeks     History of Present Sleep Complaint     Vish Newsome is a 65 year old year old male with a relevant medical history of low back pain, fatigue and 2016 PSG negative for sleep apnea who presents with chronic history of insomnia of at least 5 years duration.  He was initially seen by this provider for behavioral sleep medicine consultation in November 2016 following consultation with my colleague Dr. Toni Samano at the [facility] [place].  Patient did complete a sleep study indicating no sleep apnea or other intrinsic sleep disorder.    Patient reports that his sleep has not changed much over the last 4 years.  He reports no significant change in weight.  He does not snore frequently, gasp or choke.  He does not report witnessed apneas.  There is no morning headaches and dry mouth.  He does not report restless or uncomfortable limbs.  He does not report sleepwalking sleep talking or other abnormalities of movement or behavior during sleep.    He has not been on any trials of sedative-hypnotic medication.  He recently saw his primary care physician who offered him a prescription of trazodone.  Patient indicates he would prefer not to use sleep medication.    Vish denies sleep specific worry anxiety or rumination.  He reports work and life stress as manageable and moderate.  He does not report excessive daytime sleepiness but does report concerned about \"brain fog\" and a degree of fatigue.    Vish denies cardinal symptoms of depression.  He does not report any history of anxiety.  .    Sleep/Wake Pattern:    Shift Work - No  Source of Sleep Estimates:  verbal self-report    Time to Bed: 9-9:30 PM.  He typically reads in " bed for the same hour before falling asleep between 1030-4:11 PM  Pre-bed Routine: Watches TV may read a bit before going to bed and continue reading  Activity in Bed (stimulus control): reading  Sleep Latency: 90 minutes from when he gets into bed and 5 minutes after he turns out the lights   Times awakened: 3-4  Total Time Awake After Sleep Onset: 30-60 minutes  Time Up for the Day: 6-8 AM  Total Time in Bed: 9-10  hours  Estimated Total Sleep Time: 6.5 hours    Naps: None    Sleep Hygiene:    Behavior affecting Sleep:  extend time in bed longer if after poor night of sleep, sleep in on weekends    Environment:  Bedroom is quiet, comfortable and dark    Substance Use:  Caffeine: 1-2 beverages      Alcohol: None reported      Nicotine: None      Recreational/Illicit Drugs: None reported    Previous Sleep Studies/Consultations:    Sleep study completed    Screening     EPWORTH SLEEPINESS SCALE    Sitting and reading 1   Watching TV 1   Sitting, inactive in a public place [place]theatre [place] [facility] 0    As a passenger in a car 0   Lying down to rest in the afternoon when circumstance permit 0   Sitting and talking to someone 0   Sitting quietly after lunch without alcohol 0   In a car, while stopped for a few minutes in traffic 0   TOTAL SCORE (nl <11) 2     INSOMNIA SEVERITY INDEX     Difficulty falling asleep 1   Difficult staying asleep 4   Problems waking up to early 4   How SATISFIED/DISSATISFIED are you with your CURRENT sleep pattern? 4   How NOTICEABLE to others do you think your sleep pattern is in terms of your quality of life? 4   How WORRIED/DISTRESSED are you about your current sleep pattern? 4   To what extent do you consider your sleep problem to INTERFERE with your daily fuctioning(e.g. daytime fatigue, mood, ability to function at work/daily chores, concentration, mood,etc.) CURRENTLY? 4   INSOMNIA SEVERITY INDEX TOTAL SCORE 25   Absence of insomnia (0-7); sub-threshold insomnia (8-14); moderate insomnia  (15-21); and severe insomnia (22-28)      STOP-Bang PATRICIA Risk Score: 3/8  Low Risk:  0-2  Intermediate Risk:  3-4  High Risk:  5-8      Vitals     There were no vitals taken for this visit.     Medical History     Patient Active Problem List   Diagnosis     Degeneration of lumbar intervertebral disc     Midline low back pain without sciatica     Preventive measure     Hyperlipidemia LDL goal <100     Chronic fatigue     Elevated prostate specific antigen (PSA)        No current outpatient medications on file.       Past Surgical History:   Procedure Laterality Date     ENT SURGERY  10/2016    nasal septal deviation        No Known Allergies    Mental Health History     Prior Mental Health Diagnosis: None reported    Current Mental Health Treatment: None reported    Prior Chemical Dependency Treatment:  None reported    Family History     Family History   Problem Relation Age of Onset     Family History Negative Mother      Family History Negative Father          age 85 (cardiac)       Family History of Sleep Disorders: None reported    Social History     Social History     Socioeconomic History     Marital status:      Spouse name: None     Number of children: 0     Years of education: None     Highest education level: None   Occupational History     None   Social Needs     Financial resource strain: None     Food insecurity     Worry: None     Inability: None     Transportation needs     Medical: None     Non-medical: None   Tobacco Use     Smoking status: Never Smoker     Smokeless tobacco: Never Used   Substance and Sexual Activity     Alcohol use: No     Drug use: No     Sexual activity: Yes     Partners: Female   Lifestyle     Physical activity     Days per week: None     Minutes per session: None     Stress: None   Relationships     Social connections     Talks on phone: None     Gets together: None     Attends [employer] service: None     Active member of club or organization: None     Attends  meetings of clubs or organizations: None     Relationship status: None     Intimate partner violence     Fear of current or ex partner: None     Emotionally abused: None     Physically abused: None     Forced sexual activity: None   Other Topics Concern     Parent/sibling w/ CABG, MI or angioplasty before 65F 55M? 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  The patient was cooperative throughout the evaluation with no signs of hallucinations, delusions, loosening of associations or other thought disturbance.  Mood was normal Affect was congruent with mood. Insight and judgement were intact.  Memory appeared intact for recent and remote elements.  There was no report of suicidal ideation, intention or plan. Attention and concentration were within normal.      Copy:   Brian Tineo               No referring provider defined for this encounter.    Andrew Ibanez PsyD, LP, [facility]  Diplomate, Behavioral Sleep Medicine  [facility]    Note: This dictation was created using voice recognition software. This document may contain an error not identified before finalizing the document. If the error changes the accuracy of the document, I would appreciate it being brought to my attention.

## 2021-03-25 ENCOUNTER — VIRTUAL VISIT (OUTPATIENT)
Dept: SLEEP MEDICINE | Facility: CLINIC | Age: 66
End: 2021-03-25
Payer: COMMERCIAL

## 2021-03-25 DIAGNOSIS — F51.04 CHRONIC INSOMNIA: Primary | ICD-10-CM

## 2021-03-25 PROCEDURE — 90791 PSYCH DIAGNOSTIC EVALUATION: CPT | Mod: 95 | Performed by: PSYCHOLOGIST

## 2021-03-25 NOTE — PATIENT INSTRUCTIONS
Vish,    Below are the modules of information I like you to read.  The first is how you think chronic insomnia develops that is maintained.  The second module his instructions about how to improve your sleep using the method of sleep consolidation.  Follow-up with directions is provided.  Individuals were most consistent to have the best results.         Welcome to Cognitive Behavioral Therapy for Insomnia (CBT-I)    Most people have trouble sleeping at some point in their life.   Chronic insomnia means you have had trouble falling asleep and/or staying asleep for at least the past three months.  Despite allowing enough time for sleep, it is affecting how you feel. You are not alone.  It is estimated that 10-15% of adults experience chronic insomnia.     How CBT-I Works    Cognitive Behavioral Therapy for Insomnia (CBT-I) targets behavioral factors that lead to long-term insomnia:    ? Conditioning    When you lay awake in bed over many nights, your body actually becomes trained or 'conditioned' to be awake during the night.  It makes the bed associated with alertness instead of sleepiness.    ? Habits that weaken your body's sleep drive and sleep clock    Changing sleep schedules, extended time in bed, using mobile devices and computers close to bedtime, and naps too late in the day can harm your sleep at night.    ? Emotional and Physical States    Things such as worrying about sleep, stress, drinking too much caffeine, and not winding down before bed can interfere with your body's natural sleep drive.    Understanding Sleep and Insomnia    [facility] CBT-I is a four-part program that teaches you the skills needed for a better night's sleep. The first step in your program is learning a bit about sleep and insomnia.  This introduction discusses some of the science behind the habits you will change during your program. You will also begin taking the first steps towards changing how you sleep.    The  Basics of Sleep    How does sleep help us?    Sleep like food and water is something we need every day. The purpose of sleep is still not exactly clear, but sleep experts agree we need consistent quality sleep to function at our best. There is evidence that sleep helps maintain brain and body functions.  IT helps maintain thinking ability and mood.  Sleep is actually a very active part of life. Sleep occurs in four stages that cycle every  minutes throughout the night. We get our deepest sleep during the first few hours of sleep.  During the last half of our sleep, we usually get the bulk of our REM (Rapid Eye Movement) sleep.  REM sleep is when most of our dreaming occurs.    How much sleep do we need?    Sleep needs vary from person to person. Most adults need between 6-8 hours of sleep.  Sleeping too little or too much may be a health risk.  As we age, most people report their sleep gets lighter, earlier, shorter, and more restless.     What Controls Our Sleep?    The three things that regulate your sleep are your sleep drive, biological clock and emotional/physical states.  Together these make us feel alert during the day and promote sleep at night.    Your sleep drive depends on how long you have been awake. It is lowest when you first wake up.  Sleep drive gradually increases as the day goes on.  The longer time you are awake the easier it is to fall asleep.  Sleeping gradually reduces your sleep drive. That is why napping in the evening or close to bed can make it harder to sleep at night.    Your biological clock promotes wakefulness during the day and sleep at night.        Figure 1 two process sleep model (source: 1. Chris GOODRICH, Kanika R, Ravi T, et al. Future research directions in sleep and ADHD: report of a consensus working group.   J Atten Disord. 2013;17(7):550-564. doi:10.1177/7078212255086392)    Emotional and Physical States    Mental activity, emotions and physical symptoms can make your  brain too active to sleep by masking the strength of your sleep drive. Your brain's arousal system not only triggers insomnia, it plays a role in maintaining it as well.  Common sources of arousal include:    ? Worry about sleep in bed  ? An active mind concerned about unfinished tasks  ? Anxiety, Stress and Depression  ? Pain     Understanding Insomnia    What causes insomnia?  Some people have a greater likelihood of developing insomnia due to genetics, personality or age. A host of things can trigger it: jet lag, working a different shift, medication, or the onset of a medical or mental health condition.    What maintains insomnia?    Short-term insomnia can become chronic when you begin to feel fearful, worried and  on guard  about sleep loss. As you spend more time in bed or try forcing yourself to sleep your bed becomes linked with wakefulness.  This is why people with insomnia commonly report feeling tired before bed but suddenly more alert when getting into bed.  This type of  conditioning  maintains the insomnia even when the triggering event or condition is resolved.    Tracking your Sleep    Sleep tracking is an essential part of training yourself to sleep better and monitoring your progress.  There are several ways to keep track of your sleep habits.    [facility] Sleep Diary    The  [facility] sleep diary is easy to use and includes an example of how to complete it every morning after you get up for the day. Do not watch the clock at night.  Your daily diary entry is your recollection of the past 24 hours.    Sleep Diary Apps    CBT-i  and other free sleep diary apps are a convenient way to track your sleep on your mobile phone. If using CBT-i , simply go to My Sleep >Sleep Diary > Add New Entry and record your entry for the day. The mario graphs your information and has helpful reminders.      Mobile and Wearable Sleep Tracking Devices    There are many sleep tracking devices and mobile  applications that estimate the timing and quantity of sleep by measuring body movement.  Though many of these devices claim to measure the depth or stages of sleep, they cannot measure brain wave activity or other measures required to determine the actual stages of sleep.  They are helpful in estimating the timing and total amount of time you sleep.    CBT-I and Sleeping Pills    Sleep medications can be helpful in the short-term but often stop working in the long-term.  Sleeping pills treat symptoms and not the underlying cause of insomnia.  They also can have side effects that last well into the day. Abruptly stopping sleeping pills can cause temporary rebound insomnia and lead to increased distress about sleeplessness.  This in turn strengthens the belief that pills are necessary for sleep.    Many patients choose to discontinue sleeping pills prior to beginning CBT-I.  You should talk to your prescribing provider before tapering or discontinuing sleep medication.    Changes You Can Make TODAY    As you begin to track your sleep, we recommend you get started today with the following changes that set the stage for a better night's sleep.    Reduce your consumption of caffeine and alcohol.  Both can disrupt sleep and make strengthening your sleep more difficult.      ? No caffeine within 6 hours of bedtime   ? No more than 3 caffeinated tin per day (regular 8 oz. cups)  ? No alcohol within 3 hours of bedtime    Make sure your bedroom is quiet, comfortable and dark.  Noise, light and an uncomfortable sleep space can harm your sleep.      ? Light in the bedroom can affect the depth and even the timing of your sleep.   ?  Remove anything from your sleep space unrelated to sleep such as a computer or TV.    ? Make sure the temperature in your sleep space is comfortable - neither too warm nor cold.        Cognitive Behavioral Therapy for Insomnia (CBT-I)    Sleep Consolidation Training    Now that you understand a bit  more about how sleep works and what causes insomnia, you are ready to move on to the core of CBT-I:  Sleep Consolidation Training.  This process involves five key strategies that will:    ? Strengthen your sleep drive and biological sleep clock  ? Strengthen the link between your bed and sleep    Core Sleep Strengthening Strategies     You are now ready to start the process of strengthening your sleep. Much like physical therapy strengthens muscles, studies show these five sleep strategies are the key to achieving stronger, healthier sleep.     1. Reduce your  Time In Bed    Spending extra time in bed trying to get more sleep actually can cause more sleep disruption and weaken sleep efficiency. Sleep efficiency is simply the percentage of time a person spends asleep while in bed. Normal sleep efficiency is thought to be 85% or greater.  By reducing your time in bed, you will be awake longer leading to quicker and deeper sleep. This strategy does not reduce the amount of sleep you get, just the time you are awake in bed:                                       Your Sleep Schedule Prescription    During the first step of sleep strengthening, you will reduce your time in bed following a Sleep Schedule Prescription. Your prescription is determined by the average nightly amount of sleep you got over the past two weeks plus 30 minutes. It also takes into account the best time for you to sleep. The least amount of time prescribed is 6 hours in bed unless a sleep specialist recommends otherwise.     Total Time in Bed:  7 hours  Bedtime:  No earlier than  11:30 PM  Wake-up Time:  Out of bed every day by  6:30 PM     2. Don't go to bed until you feel sleepy (not tired or fatigued)     This helps increase your sleep drive by keeping you awake longer.  If you go to bed when you're not sleepy, it gives your brain the wrong message and can lead to frustration.     If you feel sleepy before your prescribed bedtime, find activities  that can help you stay awake until it is time for you to go to bed. Even brief naps in the evening can be very disruptive of sleep at night.     3. Don't stay in bed unless you are sleeping      If you are unable to fall asleep or return to sleep after about 30 minutes, get out of bed. This helps train your brain that the bed is for sleep. It is harmful to your sleep when you are worried or frustrated in bed. Do not read, eat, worry, think about sleep, use mobile phones or tablets, or watch TV in bed. Do not watch the clock during the night.     Go to another room and do something relaxing. Plan things you can do when you get out of bed. Avoid use of mobile devices or computers when out of bed.    Return to bed only when you feel sleepy again.  Repeat as often as needed throughout the night.     4. Get out of bed at the same time every day    Getting up at the same time helps set your biological clock. It is important to stick to your wake time no matter how much sleep you got the night before or how you feel in the morning.    Varying your wake can have the same effect as jet lag.  It disrupts your biological clock and makes you feel more tired and exhausted.  Trying to  catch up  on sleep on the weekends only makes things worse and sets you up for a cycle of poor sleep the following weekdays.      Make sure you set an alarm and keep it away from the bed to prevent looking at the clock during the night.      5. Avoid daytime napping    Avoid daytime napping if possible. Napping partially fulfills your need for sleep and weakens your sleep drive at night.    However, if you find yourself sleepy (not just tired) you can take a brief 15-30 minute nap during the day if needed.  Naps within 7-8 hours of your prescribed wake time are less likely to affect your sleep the coming night.  Sleepy people make more mistakes and may hurt themselves or others. Therefore, safety trumps all other sleep prescriptions.  Never drive  or operate equipment if drowsy or sleepy.     Changing Your Sleep Schedule Prescription    After one week, you can adjust your sleep schedule prescription based on how well you are sleeping. Use the following guidelines to change your prescribed time in bed based on information from your Sleep Diary, Mobile Sleep Diary Emilee or Wearable Device:          Increase Time in Bed by 15 Minutes IF:      You are falling asleep in less than 30 minutes AND awake less than 30 minutes during the night.                 Decrease Time in Bed by 15 Minutes IF:      It is taking longer than 30 minutes to fall asleep OR you are awake more than 30 minutes during the night.  .      DO NOT decrease your sleep schedule below 6 hours unless instructed by your provider.    Change is Challenging     Research shows that making significant changes in sleep habits improves sleep quality and how you feel. Improvement takes time and is not always steady. Change is challenging and can be stressful.  This is especially true if old habits - like spending more time in bed -- were a way to avoid worry about getting enough sleep.

## 2021-04-13 VITALS — HEIGHT: 71 IN | WEIGHT: 165 LBS | BODY MASS INDEX: 23.1 KG/M2

## 2021-04-13 RX ORDER — TIMOLOL/DORZOLAMIDE/LATANOP/PF 0.5-2-.005
DROPS OPHTHALMIC (EYE)
COMMUNITY
Start: 2020-06-10

## 2021-04-13 ASSESSMENT — MIFFLIN-ST. JEOR: SCORE: 1555.57

## 2021-04-13 NOTE — PATIENT INSTRUCTIONS
Your BMI is Body mass index is 23.01 kg/m .  Weight management is a personal decision.  If you are interested in exploring weight loss strategies, the following discussion covers the approaches that may be successful. Body mass index (BMI) is one way to tell whether you are at a healthy weight, overweight, or obese. It measures your weight in relation to your height.  A BMI of 18.5 to 24.9 is in the healthy range. A person with a BMI of 25 to 29.9 is considered overweight, and someone with a BMI of 30 or greater is considered obese. More than two-thirds of American adults are considered overweight or obese.  Being overweight or obese increases the risk for further weight gain. Excess weight may lead to heart disease and diabetes.  Creating and following plans for healthy eating and physical activity may help you improve your health.  Weight control is part of healthy lifestyle and includes exercise, emotional health, and healthy eating habits. Careful eating habits lifelong are the mainstay of weight control. Though there are significant health benefits from weight loss, long-term weight loss with diet alone may be very difficult to achieve- studies show long-term success with dietary management in less than 10% of people. Attaining a healthy weight may be especially difficult to achieve in those with severe obesity. In some cases, medications, devices and surgical management might be considered.  What can you do?  If you are overweight or obese and are interested in methods for weight loss, you should discuss this with your provider.     Consider reducing daily calorie intake by 500 calories.     Keep a food journal.     Avoiding skipping meals, consider cutting portions instead.    Diet combined with exercise helps maintain muscle while optimizing fat loss. Strength training is particularly important for building and maintaining muscle mass. Exercise helps reduce stress, increase energy, and improves fitness.  Increasing exercise without diet control, however, may not burn enough calories to loose weight.       Start walking three days a week 10-20 minutes at a time    Work towards walking thirty minutes five days a week     Eventually, increase the speed of your walking for 1-2 minutes at time    In addition, we recommend that you review healthy lifestyles and methods for weight loss available through the National Institutes of Health patient information sites:  http://win.niddk.nih.gov/publications/index.htm    And look into health and wellness programs that may be available through your health insurance provider, employer, local community center, or [employer].

## 2021-04-13 NOTE — PROGRESS NOTES
Vish is a 65 year old who is being evaluated via a billable video visit.  (Couldn't do video, short phone visit, patient in his car)    How would you like to obtain your AVS? Mail a copy  If the video visit is dropped, the invitation should be resent by: Text to cell phone: [phone]  Will anyone else be joining your video visit? No  Ana Rosa To CMA    Originating Location (pt. Location): Other car    Distant Location (provider location):  [facility]     Platform used for Video Visit: Unable to complete video visit    Patient under the impression he could do his visit in car on phone while driving to work (this was set up as a video visit).  Indicated this is not a good idea.  He has challenging insomnia, very difficult to impossible to establish a trusting relationship, explore whether someone has underlying circadian rhythm disorder, motor restlessness or whether this is a conditioned response.      This is also a third opinion and he is interested in a medication or new supplement.      Explained that the best way to approach this is with a face to face (or at least video face to face) establish trust and explore whether other conditions were at play (he was fairly convinced their were not but this would need to be reexplored by any new provider) and then if he does have hypervigilence focus like a laser beam on CBT which he just had his first visit with Dr Ibanez but has not follow up.  I encouraged him to do so.  If he would like a second opinion I suggested a face to face visit and I could do that.  If he was looking for an approach from a different institution I suggested [facility] which is excellent and only doing face to face in person visits.      9 minutes were spent on the phone today.

## 2021-04-14 ENCOUNTER — VIRTUAL VISIT (OUTPATIENT)
Dept: SLEEP MEDICINE | Facility: CLINIC | Age: 66
End: 2021-04-14
Payer: COMMERCIAL

## 2021-04-14 DIAGNOSIS — F51.04 PSYCHOPHYSIOLOGICAL INSOMNIA: Primary | ICD-10-CM

## 2021-04-14 PROCEDURE — 99207 PR NO BILLABLE SERVICE THIS VISIT: CPT | Mod: 95 | Performed by: PSYCHIATRY & NEUROLOGY

## 2021-04-30 ENCOUNTER — VIRTUAL VISIT (OUTPATIENT)
Dept: SLEEP MEDICINE | Facility: CLINIC | Age: 66
End: 2021-04-30
Payer: COMMERCIAL

## 2021-04-30 DIAGNOSIS — F51.04 CHRONIC INSOMNIA: Primary | ICD-10-CM

## 2021-04-30 PROCEDURE — 90834 PSYTX W PT 45 MINUTES: CPT | Mod: 95 | Performed by: PSYCHOLOGIST

## 2021-04-30 NOTE — PROGRESS NOTES
"Vish Newsome is a 65 year old male being evaluated via a billable telephone visit.     \"This telephone visit will be conducted via a call between you and your physician/provider. We have found that certain health care needs can be provided without the need for an in-person visit or physical exam.  This service lets us provide the care you need with a telephone conversation.  If a prescription is necessary we can send it directly to your pharmacy.  If lab work is needed we can place an order for that and you can then stop by our lab to have the test done at a later time.\"    Video visits are billed at different rates depending on your insurance coverage.  Please reach out to your insurance provider with any questions.    Patient has given verbal consent for  a Telephone visit? Yes    What telephone number would you like your provider to contact at at:  See chart    How would you like to obtain your AVS? MyChart    Telephone Visit Details:     Telephone Visit Start Time: 10:15 AM    Telephone Visit End Time:10:58 AM       SLEEP MEDICINE  TELEPHONE FOLLOW-UP VISIT  Sleep Psychology    Patient Name: Vish Newsome  MRN:  [id number]  Date of Service: Apr 30, 2021       Subjective Report     Vish Newsome  returns for a telephone visit to discuss progress in implementing behavioral strategies for the management of insomnia.  Patient consent for initiation of telephone visit was obtained and documented prior to initiation of visit.     Vish reports no improvement in overall sleep quality.  He did consult with my colleague, Dr. Andrew Frederick who recommended continue with CBT for insomnia.  We reviewed early morning awakening and that there may be a mild advanced sleep phase delay.  We did review stimulus control and in particular sleep restriction therapy.  He continues to report some fatigue but no excessive daytime sleepiness.  He estimates that he is getting between 5.5-6 hours of sleep.  He is " allowing a 6-hour sleep window with most of this wake after sleep onset occurring between 4-6 AM.  He does awaken frequently and is frustrated about this.    We discussed the rationale for sleep restriction therapy and stimulus control.  We also discussed his questions related to use of supplements, diet and exercise.  He was also provided reading resources as he does not have pewter and does not do video visits.  He was introduced to the validated self-help book, say good night to insomnia by Dr. Joel Friedman.    Patient was offered follow-up and based on his self-report was advised to reduce time in bed minimally to 7 hours in bed..     .     Sleep Data:         EPWORTH SLEEPINESS SCALE    Sitting and reading 1   Watching TV 1   Sitting, inactive in a public place (theatre or mtg.) 0    As a passenger in a car 0   Lying down to rest in the afternoon when circumstance permit 1   Sitting and talking to someone 0   Sitting quietly after lunch without alcohol 1   In a car, while stopped for a few minutes in traffic 0   TOTAL SCORE (nl <11) 4     INSOMNIA SEVERITY INDEX     Difficulty falling asleep 1   Difficult staying asleep 3   Problems waking up to early 2   How SATISFIED/DISSATISFIED are you with your CURRENT sleep pattern? 3   How NOTICEABLE to others do you think your sleep pattern is in terms of your quality of life? 2   How WORRIED/DISTRESSED are you about your current sleep pattern? 3   To what extent do you consider your sleep problem to INTERFERE with your daily fuctioning(e.g. daytime fatigue, mood, ability to function at work/daily chores, concentration, mood,etc.) CURRENTLY? 3   INSOMNIA SEVERITY INDEX TOTAL SCORE 17    Absence of insomnia (0-7); sub-threshold insomnia (8-14); moderate insomnia (15-21); and severe insomnia (22-28)       Interventions     Strategies and recommendations including implementation of stimulus control, managing sleep restriction/compression and Exercise, diet and sleep were  discussed today.       Vital Signs     There were no vitals taken for this visit.     Mental Status   Orientation:  X3  Mood:  normal  Speech/Language:  Normal  Thought Process: Intact  Associations:  Normal  Thought Content: Normal  Patient does not report any suicidal ideation, intention or plan.      Diagnostic Impressions and Plan         Plan:  Recommend the patient consider reducing time in bed by at least 1 hour.  Patient does not have computer access to consider online CBT-I.  It is unclear as to whether he wants to continue to pursue cognitive behavioral therapy with a sleep restriction.  We did discuss the rationale for reducing time in bed to increase homeostatic sleep drive.  He was provided self-help reading resources by Dr. Joel Friedman to consider.    Follow-up: as needed      Andrew Ibanez PsyD, LP, [facility]  Diplomate, Behavioral Sleep Medicine  [facility]    Note: This dictation was created using voice recognition software. This document may contain an error not identified before finalizing the document. If the error changes the accuracy of the document, I would appreciate it being brought to my attention.                                                 .